# Patient Record
Sex: MALE | Race: WHITE | NOT HISPANIC OR LATINO | Employment: OTHER | URBAN - METROPOLITAN AREA
[De-identification: names, ages, dates, MRNs, and addresses within clinical notes are randomized per-mention and may not be internally consistent; named-entity substitution may affect disease eponyms.]

---

## 2017-12-10 RX ORDER — METFORMIN HYDROCHLORIDE 500 MG/1
TABLET ORAL
Qty: 60 TABLET | Refills: 0 | OUTPATIENT
Start: 2017-12-10

## 2017-12-10 NOTE — TELEPHONE ENCOUNTER
Please inquire about more information about Rashid Del Valle; labs were ordered by Dr. Gabriel Rogers, on plain MD script; [facility]; in encounters no [facility] MD has seen him; he has no PCP listed; unsure why script for metformin came to me. Please see if you can find out. Request likely needs to be sent back to lab ordering MD, Dr Gabriel Rogers. Labs done at [facility]. Is Dr Rogers a [facility] MD.

## 2017-12-11 NOTE — TELEPHONE ENCOUNTER
Spoke with pt and he said that his PCP is Dr. Gabriel oRgers and that he will contact there office for his refill.

## 2018-03-06 ENCOUNTER — PATIENT OUTREACH (OUTPATIENT)
Dept: ADMINISTRATIVE | Facility: HOSPITAL | Age: 48
End: 2018-03-06

## 2018-03-06 NOTE — LETTER
March 6, 2018    Rashid Del Valle  [address] [place] [address]             [facility]  [address]  [facility] [address]  Phone: [phone] Dear Mr. Del Valle:    [facility] is committed to your overall health.  To help you get the most out of each of your visits, we will review your information to make sure you are up to date on all of your recommended tests and/or procedures.      As a new patient to Dr. Matteo Torre, we may not have your complete medical records.  He has found that your chart shows you may be due for tetanus and flu immunizations.     If you have had any of the above done at another facility, please bring the records or information with you so that your record at [facility] will be complete.      If you are currently taking medication, please bring it with you to your appointment for review.    If you have any questions or concerns, please don't hesitate to call.    Thank you for letting us care for you,  Bev Abarca LPN Clinical Care Coordinator  [facility] [place]  [phone]

## 2018-03-20 ENCOUNTER — OFFICE VISIT (OUTPATIENT)
Dept: FAMILY MEDICINE | Facility: CLINIC | Age: 48
End: 2018-03-20
Payer: COMMERCIAL

## 2018-03-20 VITALS
OXYGEN SATURATION: 97 % | WEIGHT: 218.81 LBS | HEART RATE: 76 BPM | SYSTOLIC BLOOD PRESSURE: 110 MMHG | HEIGHT: 71 IN | TEMPERATURE: 99 F | BODY MASS INDEX: 30.63 KG/M2 | DIASTOLIC BLOOD PRESSURE: 80 MMHG

## 2018-03-20 DIAGNOSIS — K30 STOMACH UPSET: ICD-10-CM

## 2018-03-20 DIAGNOSIS — E66.9 OBESITY (BMI 30.0-34.9): ICD-10-CM

## 2018-03-20 DIAGNOSIS — E78.2 MIXED HYPERLIPIDEMIA: ICD-10-CM

## 2018-03-20 DIAGNOSIS — E11.9 TYPE 2 DIABETES MELLITUS WITHOUT COMPLICATION, WITHOUT LONG-TERM CURRENT USE OF INSULIN: Primary | ICD-10-CM

## 2018-03-20 DIAGNOSIS — F41.1 GAD (GENERALIZED ANXIETY DISORDER): ICD-10-CM

## 2018-03-20 LAB — GLUCOSE SERPL-MCNC: 172 MG/DL (ref 70–110)

## 2018-03-20 PROCEDURE — 99999 PR PBB SHADOW E&M-EST. PATIENT-LVL III: CPT | Mod: PBBFAC,,, | Performed by: INTERNAL MEDICINE

## 2018-03-20 PROCEDURE — 82948 REAGENT STRIP/BLOOD GLUCOSE: CPT | Mod: S$GLB,,, | Performed by: INTERNAL MEDICINE

## 2018-03-20 PROCEDURE — 99204 OFFICE O/P NEW MOD 45 MIN: CPT | Mod: S$GLB,,, | Performed by: INTERNAL MEDICINE

## 2018-03-20 RX ORDER — ASPIRIN 81 MG/1
81 TABLET ORAL DAILY
COMMUNITY
End: 2022-08-18

## 2018-03-20 RX ORDER — OMEPRAZOLE 20 MG/1
CAPSULE, DELAYED RELEASE ORAL
Qty: 30 CAPSULE | Refills: 11 | Status: SHIPPED | OUTPATIENT
Start: 2018-03-20 | End: 2018-03-20

## 2018-03-20 RX ORDER — METFORMIN HYDROCHLORIDE 500 MG/1
500 TABLET, EXTENDED RELEASE ORAL
Qty: 30 TABLET | Refills: 2 | Status: SHIPPED | OUTPATIENT
Start: 2018-03-20 | End: 2018-06-19 | Stop reason: SDUPTHER

## 2018-03-20 RX ORDER — OMEPRAZOLE 20 MG/1
CAPSULE, DELAYED RELEASE ORAL
Qty: 30 CAPSULE | Refills: 2 | Status: SHIPPED | OUTPATIENT
Start: 2018-03-20 | End: 2019-08-13

## 2018-03-20 RX ORDER — CLONAZEPAM 1 MG/1
1 TABLET ORAL 3 TIMES DAILY
COMMUNITY

## 2018-03-20 RX ORDER — METFORMIN HYDROCHLORIDE 500 MG/1
500 TABLET ORAL
COMMUNITY
End: 2018-03-20

## 2018-03-20 RX ORDER — BUSPIRONE HYDROCHLORIDE 10 MG/1
10 TABLET ORAL 2 TIMES DAILY
COMMUNITY
End: 2018-12-29

## 2018-03-20 RX ORDER — CITALOPRAM 40 MG/1
40 TABLET, FILM COATED ORAL DAILY
COMMUNITY

## 2018-03-20 NOTE — PROGRESS NOTES
Subjective:       Patient ID: Rashid Del Valle is a 47 y.o. male.    Chief Complaint: to get established; DM tx.    HPI  Pt here to get established w me again as his PCP here at [facility] in [place]. PMH/surg hx reviewed and noted. SM[facility] also reviewed and noted.   ROS obtained at length prior to physical performed. DM2: CBG's avr 126; on metformin 500 mg daily in am. POCT glucose 172; hamburger 30 min ago. Regular stomach upset; not sure if related to metformin; will change to metformin XR; add omeprazole 20 mg a day as needed for stomach upset. Total time: 300 pm-345 pm; >50% time spent on discussion, counseling, and review.  Labs ordered for f/u. Old labs reviewed.    Review of Systems   Constitutional: Negative for appetite change and unexpected weight change.   HENT: Negative for congestion, postnasal drip, rhinorrhea and sinus pressure.         Denies seasonal allergies, or perennial allergies   Eyes: Negative for discharge and itching.   Respiratory: Negative for cough, chest tightness, shortness of breath and wheezing.    Cardiovascular: Negative for chest pain, palpitations and leg swelling.   Gastrointestinal: Negative for abdominal pain, blood in stool, constipation, diarrhea, nausea and vomiting.   Endocrine: Negative for polydipsia, polyphagia and polyuria.   Genitourinary: Negative for dysuria and hematuria.   Musculoskeletal: Negative for arthralgias and myalgias.   Skin: Negative for rash.   Allergic/Immunologic: Negative for environmental allergies and food allergies.   Neurological: Negative for tremors, seizures and headaches.   Hematological: Negative for adenopathy. Does not bruise/bleed easily.   Psychiatric/Behavioral:        Denies anxiety or depression.       Objective:      Vitals:    03/20/18 1428   BP: 110/80   BP Location: Left arm   Patient Position: Sitting   BP Method: Medium (Manual)   Pulse: 76   Temp: 98.6 °F (37 °C)   TempSrc: Oral   SpO2: 97%   Weight: 99.2 kg (218 lb 12.9  "oz)   Height: 5' 11" (1.803 m)     Body mass index is 30.52 kg/m².    Physical Exam   Constitutional: He is oriented to person, place, and time. He appears well-developed and well-nourished.   HENT:   Head: Normocephalic and atraumatic.   Eyes: EOM are normal.   Neck: Normal range of motion. Neck supple. No thyromegaly present.   Cardiovascular: Normal rate, regular rhythm and normal heart sounds.  Exam reveals no gallop.    No murmur heard.  Pulmonary/Chest: Effort normal and breath sounds normal. No respiratory distress. He has no wheezes. He has no rales.   Abdominal: Soft. Bowel sounds are normal. He exhibits no distension. There is no tenderness. There is no rebound and no guarding.   Musculoskeletal: Normal range of motion. He exhibits no edema.   Lymphadenopathy:     He has no cervical adenopathy.   Neurological: He is alert and oriented to person, place, and time.   Moves all 4 extremities fine.   Skin: No rash noted.   Psychiatric: He has a normal mood and affect. His behavior is normal. Thought content normal.   Vitals reviewed.      Assessment:       1. Type 2 diabetes mellitus without complication, without long-term current use of insulin    2. Mixed hyperlipidemia    3. Obesity (BMI 30.0-34.9)    4. DESI (generalized anxiety disorder)    5. Stomach upset        Plan:       Type 2 diabetes mellitus without complication, without long-term current use of insulin. Maintain a low carb diet; monitor CBG's at home; keep a log for review.         Cont metformin.but change to XR to help stomach. Ophth eye exam needed as well; has been >1 yr. Will go to [facility].   -     POCT Glucose; labs on f/u; FBS, HgA1C, UA dip; urine prealb-cr ratio.    Mixed hyperlipidemia. Maintain low fat high fiber diet, exercise regularly. Lipid level on f/u. Weight reduction.    Obesity (BMI 30.0-34.9). Caloric restriction w regular exercise and weight reduction.    DESI; situational stress as well; manages and owns dancing " school. Seeing Dr Porter as psychiatrist; on celexa, buspar, and clonazepam,     Stomach upset; anxiety, vs. Meds, vs. Metformin. Or combination. Omeprazole 20 mg po daily as needed for stomach upset;          can increase to 40 mg a day if needed.

## 2018-03-20 NOTE — PATIENT INSTRUCTIONS
Type 2 diabetes mellitus without complication, without long-term current use of insulin. Maintain a low carb diet; monitor CBG's at home; keep a log for review.         Cont metformin.but change to XR to help stomach.  -     POCT Glucose    Mixed hyperlipidemia. Maintain low fat high fiber diet, exercise regularly. Weight reduction    Obesity (BMI 30.0-34.9). Caloric restriction w regular exercise and weight reduction.    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 Meds, vs. Metformin. Or combination. Omeprazole 20 mg po daily as needed for stomach upset;          can increase to 40 mg a day if needed.

## 2018-03-29 ENCOUNTER — OFFICE VISIT (OUTPATIENT)
Dept: FAMILY MEDICINE | Facility: CLINIC | Age: 48
End: 2018-03-29
Payer: COMMERCIAL

## 2018-03-29 VITALS
WEIGHT: 212.5 LBS | BODY MASS INDEX: 29.75 KG/M2 | HEIGHT: 71 IN | TEMPERATURE: 98 F | DIASTOLIC BLOOD PRESSURE: 80 MMHG | HEART RATE: 74 BPM | SYSTOLIC BLOOD PRESSURE: 120 MMHG

## 2018-03-29 DIAGNOSIS — Z81.8 FAMILY HISTORY OF DEPRESSION: ICD-10-CM

## 2018-03-29 DIAGNOSIS — G47.00 INSOMNIA, UNSPECIFIED TYPE: ICD-10-CM

## 2018-03-29 DIAGNOSIS — R10.12 ACUTE BILATERAL UPPER ABDOMINAL PAIN: Primary | ICD-10-CM

## 2018-03-29 DIAGNOSIS — R10.11 ACUTE BILATERAL UPPER ABDOMINAL PAIN: Primary | ICD-10-CM

## 2018-03-29 DIAGNOSIS — R68.83 CHILLS WITHOUT FEVER: ICD-10-CM

## 2018-03-29 DIAGNOSIS — F41.1 GAD (GENERALIZED ANXIETY DISORDER): ICD-10-CM

## 2018-03-29 DIAGNOSIS — F32.A DEPRESSION, UNSPECIFIED DEPRESSION TYPE: ICD-10-CM

## 2018-03-29 PROCEDURE — 99215 OFFICE O/P EST HI 40 MIN: CPT | Mod: S$GLB,,, | Performed by: INTERNAL MEDICINE

## 2018-03-29 PROCEDURE — 99999 PR PBB SHADOW E&M-EST. PATIENT-LVL III: CPT | Mod: PBBFAC,,, | Performed by: INTERNAL MEDICINE

## 2018-03-29 RX ORDER — QUETIAPINE FUMARATE 50 MG/1
50 TABLET, FILM COATED ORAL NIGHTLY
COMMUNITY

## 2018-03-29 RX ORDER — FLUOXETINE HYDROCHLORIDE 20 MG/1
1 CAPSULE ORAL DAILY
Refills: 1 | COMMUNITY
Start: 2018-03-22 | End: 2018-12-29

## 2018-03-29 RX ORDER — MIRTAZAPINE 30 MG/1
30 TABLET, FILM COATED ORAL NIGHTLY
COMMUNITY

## 2018-03-29 NOTE — PATIENT INSTRUCTIONS
Acute bilateral upper abdominal pain; RUQ/epigastric>LUQ abd pain, w intermittent chills at night; needs to go to [facility] for further evaluation and treatment asap; need to r/o cholecystitis/GB disease. For further evaluation of his abd pain including imaging and labs initially. PPI initiation. Long discussion w pt and his Dad. Understand imporatnce of going to ER and not to home.    Chills without fever    DESI (generalized anxiety disorder); meds as per Dr Rogers his psychiatrist. He needs to later call his psychiatrist regarding his meds as well. Also needs to be evaluated from psych standpoint as well; if cleared medically due to severe anxiety w depression, and psych hospitalization in past. Can benefit from psych consult as well if not psych admit when cleared medically.    Depression, unspecified depression type    Insomnia, unspecified type

## 2018-06-19 DIAGNOSIS — E11.9 TYPE 2 DIABETES MELLITUS WITHOUT COMPLICATION, WITHOUT LONG-TERM CURRENT USE OF INSULIN: ICD-10-CM

## 2018-06-20 RX ORDER — METFORMIN HYDROCHLORIDE 500 MG/1
TABLET, EXTENDED RELEASE ORAL
Qty: 30 TABLET | Refills: 0 | Status: SHIPPED | OUTPATIENT
Start: 2018-06-20 | End: 2018-07-27 | Stop reason: SDUPTHER

## 2018-07-27 DIAGNOSIS — E11.9 TYPE 2 DIABETES MELLITUS WITHOUT COMPLICATION, WITHOUT LONG-TERM CURRENT USE OF INSULIN: ICD-10-CM

## 2018-07-27 RX ORDER — METFORMIN HYDROCHLORIDE 500 MG/1
TABLET, EXTENDED RELEASE ORAL
Qty: 90 TABLET | Refills: 0 | Status: SHIPPED | OUTPATIENT
Start: 2018-07-27 | End: 2018-10-26 | Stop reason: SDUPTHER

## 2018-10-26 DIAGNOSIS — E11.9 TYPE 2 DIABETES MELLITUS WITHOUT COMPLICATION, WITHOUT LONG-TERM CURRENT USE OF INSULIN: ICD-10-CM

## 2018-10-26 NOTE — LETTER
October 31, 2018    Rashid BEAVERS Eligio  [address]  [place] [address]             [facility]  [address]  [place] [address]  Phone: [phone]  Fax: [phone] Dear Mr. Del Valle:    Please contact the office at your earliest convenience to schedule an appointment to see your primary care provider and blood work.      If you have any questions or concerns, please don't hesitate to call.    Sincerely,        Kiya Rojas LPN

## 2018-10-26 NOTE — TELEPHONE ENCOUNTER
Last seen on: 3/29/2018    Next appt: N/A    Last refill: 7/27/2018    Pt needs to be scheduled for a new appointment.    [facility] [id number] [place] [address] [facility] [address] [address]  Phone: [phone] Fax: [phone]      Labs: Please review.      Glucose   Date Value Ref Range Status   05/17/2018 133 (H) 70 - 110 mg/dL Final     Comment:     The ADA recommends the following guidelines for fasting glucose:  Normal:       less than 100 mg/dL  Prediabetes:  100 mg/dL to 125 mg/dL  Diabetes:     126 mg/dL or higher         Please review! Thank you!

## 2018-10-27 RX ORDER — METFORMIN HYDROCHLORIDE 500 MG/1
TABLET, EXTENDED RELEASE ORAL
Qty: 90 TABLET | Refills: 0 | Status: SHIPPED | OUTPATIENT
Start: 2018-10-27 | End: 2019-01-09 | Stop reason: SDUPTHER

## 2018-10-29 NOTE — TELEPHONE ENCOUNTER
Tried to reach pt. No answer, left msg to call back.    [facility] Follow up

## 2018-12-29 PROBLEM — R42 DIZZINESS: Status: ACTIVE | Noted: 2018-12-29

## 2018-12-29 PROBLEM — R07.9 CHEST PAIN: Status: ACTIVE | Noted: 2018-12-29

## 2018-12-29 PROBLEM — J18.9 PNEUMONIA OF BOTH UPPER LOBES DUE TO INFECTIOUS ORGANISM: Status: ACTIVE | Noted: 2018-12-29

## 2019-01-04 DIAGNOSIS — E11.9 TYPE 2 DIABETES MELLITUS WITHOUT COMPLICATION: ICD-10-CM

## 2019-01-09 DIAGNOSIS — E11.9 TYPE 2 DIABETES MELLITUS WITHOUT COMPLICATION, WITHOUT LONG-TERM CURRENT USE OF INSULIN: ICD-10-CM

## 2019-01-09 RX ORDER — METFORMIN HYDROCHLORIDE 500 MG/1
TABLET, EXTENDED RELEASE ORAL
Qty: 30 TABLET | Refills: 0 | Status: SHIPPED | OUTPATIENT
Start: 2019-01-09 | End: 2019-02-06 | Stop reason: SDUPTHER

## 2019-01-09 NOTE — TELEPHONE ENCOUNTER
----- Message from Nabil Nichols sent at 1/9/2019  2:46 PM CST -----  Contact: same  Patient called in and stated he is completely out of his Rx for his metFORMIN (GLUCOPHAGE-XR) 500 MG 24 hr tablet and not sure why?  Patient stated he really needs a refill called in to the below pharmacy.  Rx was last filled on 10/27/17 for 90 tablets.      [facility] [address] [place] [address] & [address] [address]  Phone: [phone] Fax: [phone]    Patient call back number is [phone]

## 2019-01-09 NOTE — TELEPHONE ENCOUNTER
Pt Of Matteo Torre MD    Last seen on: 3-    Next appt: 2-6-2019    Last refill: 10-    Allergies: Review of patient's allergies indicates:  No Known Allergies    Pharmacy:   [facility] [address] [place] [address] AT [address] [address]  Phone: [phone] Fax: [phone]      Labs: Please review.    Lab Results   Component Value Date    HGBA1C 6.6 (H) 12/29/2018         Please review! Thank you!

## 2019-01-18 DIAGNOSIS — E11.9 TYPE 2 DIABETES MELLITUS WITHOUT COMPLICATION, UNSPECIFIED WHETHER LONG TERM INSULIN USE: ICD-10-CM

## 2019-02-06 DIAGNOSIS — E11.9 TYPE 2 DIABETES MELLITUS WITHOUT COMPLICATION, WITHOUT LONG-TERM CURRENT USE OF INSULIN: ICD-10-CM

## 2019-02-06 NOTE — TELEPHONE ENCOUNTER
----- Message from Caitlin Kohler RT sent at 2/6/2019  3:15 PM CST -----  Contact: pt   pt  , requesting an appt to be worked in as soon as possible: Hospital f/u appt, also need medication refill: Metformin, thanks.

## 2019-02-07 RX ORDER — METFORMIN HYDROCHLORIDE 500 MG/1
TABLET, EXTENDED RELEASE ORAL
Qty: 90 TABLET | Refills: 1 | Status: SHIPPED | OUTPATIENT
Start: 2019-02-07 | End: 2019-07-17 | Stop reason: SDUPTHER

## 2019-03-04 ENCOUNTER — OFFICE VISIT (OUTPATIENT)
Dept: URGENT CARE | Facility: CLINIC | Age: 49
End: 2019-03-04
Payer: COMMERCIAL

## 2019-03-04 VITALS
WEIGHT: 250 LBS | DIASTOLIC BLOOD PRESSURE: 110 MMHG | TEMPERATURE: 98 F | OXYGEN SATURATION: 97 % | HEIGHT: 70 IN | HEART RATE: 88 BPM | SYSTOLIC BLOOD PRESSURE: 130 MMHG | BODY MASS INDEX: 35.79 KG/M2

## 2019-03-04 DIAGNOSIS — J06.9 BACTERIAL URI: Primary | ICD-10-CM

## 2019-03-04 DIAGNOSIS — B96.89 BACTERIAL URI: Primary | ICD-10-CM

## 2019-03-04 PROCEDURE — 99214 OFFICE O/P EST MOD 30 MIN: CPT | Mod: S$GLB,,, | Performed by: PHYSICIAN ASSISTANT

## 2019-03-04 PROCEDURE — 99214 PR OFFICE/OUTPT VISIT, EST, LEVL IV, 30-39 MIN: ICD-10-PCS | Mod: S$GLB,,, | Performed by: PHYSICIAN ASSISTANT

## 2019-03-04 PROCEDURE — 3008F BODY MASS INDEX DOCD: CPT | Mod: CPTII,S$GLB,, | Performed by: PHYSICIAN ASSISTANT

## 2019-03-04 PROCEDURE — 3008F PR BODY MASS INDEX (BMI) DOCUMENTED: ICD-10-PCS | Mod: CPTII,S$GLB,, | Performed by: PHYSICIAN ASSISTANT

## 2019-03-04 RX ORDER — AZITHROMYCIN 250 MG/1
TABLET, FILM COATED ORAL
Qty: 6 TABLET | Refills: 0 | Status: SHIPPED | OUTPATIENT
Start: 2019-03-04 | End: 2019-04-01 | Stop reason: ALTCHOICE

## 2019-03-04 RX ORDER — HYDROXYZINE HYDROCHLORIDE 50 MG/1
TABLET, FILM COATED ORAL
Refills: 2 | COMMUNITY
Start: 2019-02-21

## 2019-03-04 RX ORDER — BENZONATATE 100 MG/1
100 CAPSULE ORAL EVERY 6 HOURS PRN
Qty: 60 CAPSULE | Refills: 1 | Status: SHIPPED | OUTPATIENT
Start: 2019-03-04 | End: 2019-05-17 | Stop reason: ALTCHOICE

## 2019-03-05 NOTE — PROGRESS NOTES
"Subjective:       Patient ID: Rashid Del Valle is a 48 y.o. male.    Vitals:  height is 5' 10" (1.778 m) and weight is 113.4 kg (250 lb). His oral temperature is 97.6 °F (36.4 °C). His blood pressure is 130/110 (abnormal) and his pulse is 88. His oxygen saturation is 97%.     Chief Complaint: URI    x1 week, NO OTC medication today. Pt concerned for pneumonia.       URI    This is a new problem. The current episode started 1 to 4 weeks ago. The problem has been gradually worsening. Associated symptoms include congestion (chest) and coughing. Pertinent negatives include no ear pain, nausea, rash, sinus pain, sore throat, vomiting or wheezing. He has tried antihistamine and decongestant for the symptoms. The treatment provided mild relief.       Constitution: Positive for fatigue. Negative for chills, sweating and fever.   HENT: Positive for congestion (chest). Negative for ear pain, sinus pain, sinus pressure, sore throat and voice change.    Neck: Negative for painful lymph nodes.   Eyes: Negative for eye redness.   Respiratory: Positive for cough, sputum production and shortness of breath. Negative for chest tightness, bloody sputum, COPD, stridor, wheezing and asthma.    Gastrointestinal: Negative for nausea and vomiting.   Musculoskeletal: Positive for pain (left arm occasionally). Negative for muscle ache.   Skin: Negative for rash.   Allergic/Immunologic: Negative for seasonal allergies and asthma.   Hematologic/Lymphatic: Negative for swollen lymph nodes.       Objective:      Physical Exam   Constitutional: He is oriented to person, place, and time. He appears well-developed and well-nourished. He is cooperative.  Non-toxic appearance. He does not appear ill. No distress.   HENT:   Head: Normocephalic and atraumatic.   Right Ear: Hearing, tympanic membrane, external ear and ear canal normal.   Left Ear: Hearing, tympanic membrane, external ear and ear canal normal.   Nose: Nose normal. No mucosal edema, " rhinorrhea or nasal deformity. No epistaxis. Right sinus exhibits no maxillary sinus tenderness and no frontal sinus tenderness. Left sinus exhibits no maxillary sinus tenderness and no frontal sinus tenderness.   Mouth/Throat: Uvula is midline and mucous membranes are normal. No trismus in the jaw. Normal dentition. No uvula swelling. Posterior oropharyngeal erythema present.   Eyes: Conjunctivae and lids are normal. No scleral icterus.   Sclera clear bilat   Neck: Trachea normal, full passive range of motion without pain and phonation normal. Neck supple.   Cardiovascular: Normal rate, regular rhythm, normal heart sounds, intact distal pulses and normal pulses.   Pulmonary/Chest: Effort normal and breath sounds normal. No respiratory distress.   Abdominal: Soft. Normal appearance and bowel sounds are normal. He exhibits no distension. There is no tenderness.   Musculoskeletal: Normal range of motion. He exhibits no edema or deformity.   Neurological: He is alert and oriented to person, place, and time. He exhibits normal muscle tone. Coordination normal.   Skin: Skin is warm, dry and intact. He is not diaphoretic. No pallor.   Psychiatric: He has a normal mood and affect. His speech is normal and behavior is normal. Judgment and thought content normal. Cognition and memory are normal.   Nursing note and vitals reviewed.      Assessment:       1. Bacterial URI        Plan:         Bacterial URI    Other orders  -     azithromycin (Z-RED) 250 MG tablet; Take 2 tablets by mouth on day 1; Take 1 tablet by mouth on days 2-5  Dispense: 6 tablet; Refill: 0  -     benzonatate (TESSALON PERLES) 100 MG capsule; Take 1 capsule (100 mg total) by mouth every 6 (six) hours as needed.  Dispense: 60 capsule; Refill: 1     Patient instructed to go to the ER with any increasing shortness of breath, chest pain, fever, chills, nausea or vomiting.  Elevated blood pressure likely due to OTC preparation of Mucinex.  Discussed  discontinuing this immediately.  Patient states understanding and agrees with plan.    You must understand that you've received an Urgent Care treatment only and that you may be released before all your medical problems are known or treated. You, the patient, will arrange for follow up care as instructed.  Follow up with your PCP or specialty clinic as directed in the next 1-2 weeks if not improved or as needed.  You can call [phone] to schedule an appointment with the appropriate provider.  If your condition worsens we recommend that you receive another evaluation at the emergency room immediately or contact your primary medical clinics after hours call service to discuss your concerns.  Please return here or go to the Emergency Department for any concerns or worsening of condition.

## 2019-03-05 NOTE — PATIENT INSTRUCTIONS

## 2019-03-07 ENCOUNTER — TELEPHONE (OUTPATIENT)
Dept: URGENT CARE | Facility: CLINIC | Age: 49
End: 2019-03-07

## 2019-04-01 ENCOUNTER — TELEPHONE (OUTPATIENT)
Dept: FAMILY MEDICINE | Facility: CLINIC | Age: 49
End: 2019-04-01

## 2019-04-01 ENCOUNTER — OFFICE VISIT (OUTPATIENT)
Dept: FAMILY MEDICINE | Facility: CLINIC | Age: 49
End: 2019-04-01
Payer: COMMERCIAL

## 2019-04-01 VITALS
OXYGEN SATURATION: 97 % | BODY MASS INDEX: 35.87 KG/M2 | SYSTOLIC BLOOD PRESSURE: 108 MMHG | TEMPERATURE: 98 F | RESPIRATION RATE: 15 BRPM | HEART RATE: 81 BPM | WEIGHT: 250.56 LBS | DIASTOLIC BLOOD PRESSURE: 84 MMHG | HEIGHT: 70 IN

## 2019-04-01 DIAGNOSIS — R07.89 ATYPICAL CHEST PAIN: ICD-10-CM

## 2019-04-01 DIAGNOSIS — E11.9 TYPE 2 DIABETES MELLITUS WITHOUT COMPLICATION, WITHOUT LONG-TERM CURRENT USE OF INSULIN: ICD-10-CM

## 2019-04-01 DIAGNOSIS — R07.89 ATYPICAL CHEST PAIN: Primary | ICD-10-CM

## 2019-04-01 DIAGNOSIS — Z82.49 FAMILY HISTORY OF HEART DISEASE: ICD-10-CM

## 2019-04-01 DIAGNOSIS — J18.9 MULTIFOCAL PNEUMONIA: ICD-10-CM

## 2019-04-01 DIAGNOSIS — E03.9 BORDERLINE HYPOTHYROIDISM: ICD-10-CM

## 2019-04-01 DIAGNOSIS — E78.2 MIXED HYPERLIPIDEMIA: ICD-10-CM

## 2019-04-01 DIAGNOSIS — D64.9 ANEMIA, UNSPECIFIED TYPE: ICD-10-CM

## 2019-04-01 DIAGNOSIS — E66.9 OBESITY (BMI 30.0-34.9): ICD-10-CM

## 2019-04-01 DIAGNOSIS — F41.8 ANXIETY WITH DEPRESSION: ICD-10-CM

## 2019-04-01 DIAGNOSIS — K21.9 GASTROESOPHAGEAL REFLUX DISEASE, ESOPHAGITIS PRESENCE NOT SPECIFIED: ICD-10-CM

## 2019-04-01 DIAGNOSIS — I10 ESSENTIAL HYPERTENSION: Primary | ICD-10-CM

## 2019-04-01 DIAGNOSIS — E55.9 VITAMIN D INSUFFICIENCY: ICD-10-CM

## 2019-04-01 DIAGNOSIS — E11.8 TYPE 2 DIABETES MELLITUS WITH COMPLICATION, WITHOUT LONG-TERM CURRENT USE OF INSULIN: ICD-10-CM

## 2019-04-01 LAB — GLUCOSE SERPL-MCNC: 170 MG/DL (ref 70–110)

## 2019-04-01 PROCEDURE — 93005 EKG 12-LEAD: ICD-10-PCS | Mod: S$GLB,,, | Performed by: INTERNAL MEDICINE

## 2019-04-01 PROCEDURE — 3044F HG A1C LEVEL LT 7.0%: CPT | Mod: CPTII,S$GLB,, | Performed by: INTERNAL MEDICINE

## 2019-04-01 PROCEDURE — 3079F DIAST BP 80-89 MM HG: CPT | Mod: CPTII,S$GLB,, | Performed by: INTERNAL MEDICINE

## 2019-04-01 PROCEDURE — 3079F PR MOST RECENT DIASTOLIC BLOOD PRESSURE 80-89 MM HG: ICD-10-PCS | Mod: CPTII,S$GLB,, | Performed by: INTERNAL MEDICINE

## 2019-04-01 PROCEDURE — 3074F PR MOST RECENT SYSTOLIC BLOOD PRESSURE < 130 MM HG: ICD-10-PCS | Mod: CPTII,S$GLB,, | Performed by: INTERNAL MEDICINE

## 2019-04-01 PROCEDURE — 3044F PR MOST RECENT HEMOGLOBIN A1C LEVEL <7.0%: ICD-10-PCS | Mod: CPTII,S$GLB,, | Performed by: INTERNAL MEDICINE

## 2019-04-01 PROCEDURE — 93010 ELECTROCARDIOGRAM REPORT: CPT | Mod: S$GLB,,, | Performed by: INTERNAL MEDICINE

## 2019-04-01 PROCEDURE — 93005 ELECTROCARDIOGRAM TRACING: CPT | Mod: S$GLB,,, | Performed by: INTERNAL MEDICINE

## 2019-04-01 PROCEDURE — 82962 POCT GLUCOSE, HAND-HELD DEVICE: ICD-10-PCS | Mod: S$GLB,,, | Performed by: INTERNAL MEDICINE

## 2019-04-01 PROCEDURE — 99214 OFFICE O/P EST MOD 30 MIN: CPT | Mod: S$GLB,,, | Performed by: INTERNAL MEDICINE

## 2019-04-01 PROCEDURE — 93010 EKG 12-LEAD: ICD-10-PCS | Mod: S$GLB,,, | Performed by: INTERNAL MEDICINE

## 2019-04-01 PROCEDURE — 3074F SYST BP LT 130 MM HG: CPT | Mod: CPTII,S$GLB,, | Performed by: INTERNAL MEDICINE

## 2019-04-01 PROCEDURE — 3008F BODY MASS INDEX DOCD: CPT | Mod: CPTII,S$GLB,, | Performed by: INTERNAL MEDICINE

## 2019-04-01 PROCEDURE — 3008F PR BODY MASS INDEX (BMI) DOCUMENTED: ICD-10-PCS | Mod: CPTII,S$GLB,, | Performed by: INTERNAL MEDICINE

## 2019-04-01 PROCEDURE — 99999 PR PBB SHADOW E&M-EST. PATIENT-LVL V: ICD-10-PCS | Mod: PBBFAC,,, | Performed by: INTERNAL MEDICINE

## 2019-04-01 PROCEDURE — 99999 PR PBB SHADOW E&M-EST. PATIENT-LVL V: CPT | Mod: PBBFAC,,, | Performed by: INTERNAL MEDICINE

## 2019-04-01 PROCEDURE — 82962 GLUCOSE BLOOD TEST: CPT | Mod: S$GLB,,, | Performed by: INTERNAL MEDICINE

## 2019-04-01 PROCEDURE — 99214 PR OFFICE/OUTPT VISIT, EST, LEVL IV, 30-39 MIN: ICD-10-PCS | Mod: S$GLB,,, | Performed by: INTERNAL MEDICINE

## 2019-04-01 RX ORDER — PAROXETINE HYDROCHLORIDE 40 MG/1
40 TABLET, FILM COATED ORAL DAILY
COMMUNITY
Start: 2019-03-25

## 2019-04-01 RX ORDER — PANTOPRAZOLE SODIUM 40 MG/1
TABLET, DELAYED RELEASE ORAL
Refills: 3 | COMMUNITY
Start: 2019-02-26 | End: 2019-05-17

## 2019-04-01 NOTE — PATIENT INSTRUCTIONS
Essential hypertension. Maintain < 2 Gm Na a day diet, and monitor BP at home; keep a log.  -     CBC auto differential; Future; Expected date: 04/01/2019  -     Comprehensive metabolic panel; Future; Expected date: 04/01/2019  -     Lipid panel; Future; Expected date: 04/01/2019  -     TSH; Future; Expected date: 04/01/2019  -     T4, free; Future; Expected date: 04/01/2019  -     T3, free; Future; Expected date: 04/01/2019  -     Magnesium; Future; Expected date: 04/01/2019    Mixed hyperlipidemia. Maintain low fat high fiber diet, exercise regularly.  -     Comprehensive metabolic panel; Future; Expected date: 04/01/2019  -     Lipid panel; Future; Expected date: 04/01/2019  -     Ambulatory consult to Cardiology Dr Eastman    Type 2 diabetes mellitus without complication, without long-term current use of insulin; move metformin 500 mg XR to each breakfast. Maintain a low carb diet; monitor CBG's at home; keep a log for review.  -     blood sugar diagnostic Strp; 1 strip by Misc.(Non-Drug; Combo Route) route 2 (two) times daily. As per his insurance and glucose meter. To test blood sugar levels  Dispense: 100 strip; Refill: 1  -     Comprehensive metabolic panel; Future; Expected date: 04/01/2019  -     Hemoglobin A1c; Future; Expected date: 04/01/2019  -     POCT Glucose, Hand-Held Device  -     Ambulatory consult to Cardiology Dr Eastman    Borderline hypothyroidism; no on levothyroxine.   -     TSH; Future; Expected date: 04/01/2019  -     T4, free; Future; Expected date: 04/01/2019  -     T3, free; Future; Expected date: 04/01/2019    Anxiety with depression; keep f/u w Dr ALESSANDRO Abarca his psychiatrist. Managing his anxiety/depression.   -     TSH; Future; Expected date: 04/01/2019  -     T4, free; Future; Expected date: 04/01/2019  -     T3, free; Future; Expected date: 04/01/2019  -     Ambulatory consult to Cardiology Dr Eastman    Atypical chest pain  -     Ambulatory consult to Cardiology   Jaren; EKG in office w no ischemic changes noted. Add ASA 81 mg a day.   -     X-Ray Chest PA And Lateral; Future; Expected date: 04/01/2019  -     Troponin I; Future; Expected date: 04/01/2019    Obesity (BMI 30.0-34.9)  -     TSH; Future; Expected date: 04/01/2019  -     T4, free; Future; Expected date: 04/01/2019  -     T3, free; Future; Expected date: 04/01/2019    Gastroesophageal reflux disease, esophagitis presence not specified; No bedtime snacks; weight reduction. Zantac 150 mg 2x a day as needed for reflux.   -     Ambulatory consult to Cardiology    Multifocal pneumonia  -     X-Ray Chest PA And Lateral; Future; Expected date: 04/01/2019    Anemia, unspecified type  -     CBC auto differential; Future; Expected date: 04/01/2019    Vitamin D insufficiency  -     Vitamin D; Future; Expected date: 04/01/2019  -     Comprehensive metabolic panel; Future; Expected date: 04/01/2019    Family history of heart disease  -     Ambulatory consult to Cardiology Jaren.

## 2019-04-01 NOTE — TELEPHONE ENCOUNTER
----- Message from Linda Harper sent at 4/1/2019  3:01 PM CDT -----  Regarding: EKG ORDER  Please place and link EKG order to the EKG or Doctor appointment scheduled on 04/01/19 thanks. Linda [phone]

## 2019-04-01 NOTE — PROGRESS NOTES
Subjective:       Patient ID: Rashid Del Valle is a 48 y.o. male.    Chief Complaint: Diabetes (Follow up assessment of diabetes and hypertension ) and Hypertension    HPI  patient here today for reassessment and go over his labs.  Hypertension:  Watches his salt intake.  Manual blood pressure here is elevated at 130/98; repeat 108/84.   Blood pressure at home has been averaging- not being done; stressed importance.   Mixed hyperlipidemia:  On low-fat high-fiber diet or tries.   Obesity:  BMI elevated at 35.95; patient knows that he needs to exercise on a regular basis an employee caloric restriction help lose weight. Needs to exercise more regularly.   Diabetes mellitus type 2; blood sugars at home have been averaging- 110-150; hasn't been checking last 2-3 weeks. Patient is on metformin orally; will retry 500 mg XR in morning.   GERD:  Patient is on pantoprazole 40 mg daily; doing better w med.   Depression/anxiety: Dr Abarca his psychiatrist; sees in about 2 weeks; on seroquel/remeron/paroxetine/celexa; klonopin daily at 1 mg. .     Review of Systems   Constitutional: Positive for fatigue. Negative for appetite change and unexpected weight change.   HENT: Negative for congestion, postnasal drip, rhinorrhea and sinus pressure.         Denies seasonal allergies, or perennial allergies   Eyes: Negative for discharge and itching.   Respiratory: Positive for chest tightness. Negative for cough, shortness of breath and wheezing.         Substernal in location; last few minutes; occ radiates down LUE w associated SOB; no n/v. Occurs every other day or less; suspects likely from stress. Has been occurring for about 6 mos; more lately. 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 .    Cardiovascular: Negative for chest pain, palpitations and leg swelling.   Gastrointestinal: Negative for abdominal pain, blood in stool, constipation, diarrhea, nausea and vomiting.        Reflux controlled.   "  Endocrine: Negative for polydipsia, polyphagia and polyuria.   Genitourinary: Negative for dysuria and hematuria.   Musculoskeletal: Negative for arthralgias and myalgias.   Skin: Negative for rash.   Allergic/Immunologic: Negative for environmental allergies and food allergies.   Neurological: Negative for tremors, seizures and headaches.   Hematological: Negative for adenopathy. Does not bruise/bleed easily.   Psychiatric/Behavioral:        Anxiety w depression doing a lot better.        Objective:      Vitals:    04/01/19 1307 04/01/19 1400   BP: (!) 130/98 108/84   Pulse: 81    Resp: 15    Temp: 97.9 °F (36.6 °C)    TempSrc: Oral    SpO2: 97%    Weight: 113.6 kg (250 lb 8.8 oz)    Height: 5' 10" (1.778 m)      Body mass index is 35.95 kg/m².    Physical Exam   Constitutional: He is oriented to person, place, and time. He appears well-developed and well-nourished.   HENT:   Head: Normocephalic and atraumatic.   No carotid bruits heard.    Eyes: EOM are normal.   Neck: Normal range of motion. Neck supple. No thyromegaly present.   Cardiovascular: Normal rate, regular rhythm and normal heart sounds. Exam reveals no gallop.   No murmur heard.  Pulmonary/Chest: Effort normal and breath sounds normal. No respiratory distress. He has no wheezes. He has no rales.   Abdominal: Soft. Bowel sounds are normal. He exhibits no distension. There is no tenderness. There is no rebound and no guarding.   Musculoskeletal: Normal range of motion. He exhibits no edema.   Lymphadenopathy:     He has no cervical adenopathy.   Neurological: He is alert and oriented to person, place, and time.   Moves all 4 extremities fine.   Skin: No rash noted.   Psychiatric: He has a normal mood and affect. His behavior is normal. Thought content normal.   Vitals reviewed.      Assessment:       1. Essential hypertension    2. Mixed hyperlipidemia    3. Type 2 diabetes mellitus without complication, without long-term current use of insulin    4. " Borderline hypothyroidism    5. Anxiety with depression    6. Atypical chest pain    7. Obesity (BMI 30.0-34.9)    8. Gastroesophageal reflux disease, esophagitis presence not specified    9. Multifocal pneumonia    10. Anemia, unspecified type    11. Vitamin D insufficiency    12. Family history of heart disease        Plan:       Essential hypertension. Maintain < 2 Gm Na a day diet, and monitor BP at home; keep a log.  -     CBC auto differential; Future; Expected date: 04/01/2019  -     Comprehensive metabolic panel; Future; Expected date: 04/01/2019  -     Lipid panel; Future; Expected date: 04/01/2019  -     TSH; Future; Expected date: 04/01/2019  -     T4, free; Future; Expected date: 04/01/2019  -     T3, free; Future; Expected date: 04/01/2019  -     Magnesium; Future; Expected date: 04/01/2019    Mixed hyperlipidemia. Maintain low fat high fiber diet, exercise regularly.  -     Comprehensive metabolic panel; Future; Expected date: 04/01/2019  -     Lipid panel; Future; Expected date: 04/01/2019  -     Ambulatory consult to Cardiology Dr Eastman    Type 2 diabetes mellitus without complication, without long-term current use of insulin; move metformin 500 mg XR to each breakfast. Maintain a low carb diet; monitor CBG's at home; keep a log for review.  -     blood sugar diagnostic Strp; 1 strip by Misc.(Non-Drug; Combo Route) route 2 (two) times daily. As per his insurance and glucose meter. To test blood sugar levels  Dispense: 100 strip; Refill: 1  -     Comprehensive metabolic panel; Future; Expected date: 04/01/2019  -     Hemoglobin A1c; Future; Expected date: 04/01/2019  -     POCT Glucose, Hand-Held Device  -     Ambulatory consult to Cardiology Dr Eastman    Borderline hypothyroidism; nott on levothyroxine.   -     TSH; Future; Expected date: 04/01/2019  -     T4, free; Future; Expected date: 04/01/2019  -     T3, free; Future; Expected date: 04/01/2019    Anxiety with depression; keep f/u w   ALESSANDRO Abarca his psychiatrist. Managing his anxiety/depression.   -     TSH; Future; Expected date: 04/01/2019  -     T4, free; Future; Expected date: 04/01/2019  -     T3, free; Future; Expected date: 04/01/2019  -     Ambulatory consult to Cardiology Dr Eastman    Atypical chest pain; advised to go to ER if it should worsen  -     Ambulatory consult to Cardiology Dr Eastman; EKG in office w no ischemic changes noted. Add ASA 81 mg a day.   -     X-Ray Chest PA And Lateral; Future; Expected date: 04/01/2019  -     Troponin I; Future; Expected date: 04/01/2019    Obesity (BMI 30.0-34.9)Caloric restriction w regular exercise and weight reduction.  -     TSH; Future; Expected date: 04/01/2019  -     T4, free; Future; Expected date: 04/01/2019  -     T3, free; Future; Expected date: 04/01/2019    Gastroesophageal reflux disease, esophagitis presence not specified; No bedtime snacks; weight reduction. Zantac 150 mg 2x a day as needed for reflux.   -     Ambulatory consult to Cardiology Dr. Eastman.    Multifocal pneumonia; see 12/29/18 ER note; multifocal pneumonia on f/u CT chest 12/31/18; cough essentially cleared; no fever. Needs f/u Cxray  -     X-Ray Chest PA And Lateral; Future; Expected date: 04/01/2019    Anemia, unspecified type  -     CBC auto differential; Future; Expected date: 04/01/2019    Vitamin D insufficiency  -     Vitamin D; Future; Expected date: 04/01/2019  -     Comprehensive metabolic panel; Future; Expected date: 04/01/2019    Family history of heart disease  -     Ambulatory consult to Cardiology Dr Eastman.

## 2019-04-02 ENCOUNTER — TELEPHONE (OUTPATIENT)
Dept: ADMINISTRATIVE | Facility: HOSPITAL | Age: 49
End: 2019-04-02

## 2019-04-03 ENCOUNTER — HOSPITAL ENCOUNTER (OUTPATIENT)
Dept: RADIOLOGY | Facility: HOSPITAL | Age: 49
Discharge: HOME | End: 2019-04-03
Attending: INTERNAL MEDICINE
Payer: COMMERCIAL

## 2019-04-03 DIAGNOSIS — R07.89 ATYPICAL CHEST PAIN: ICD-10-CM

## 2019-04-03 DIAGNOSIS — J18.9 MULTIFOCAL PNEUMONIA: ICD-10-CM

## 2019-04-03 PROCEDURE — 71046 X-RAY EXAM CHEST 2 VIEWS: CPT | Mod: 26,,, | Performed by: RADIOLOGY

## 2019-04-03 PROCEDURE — 71046 X-RAY EXAM CHEST 2 VIEWS: CPT | Mod: TC,PN

## 2019-04-03 PROCEDURE — 71046 XR CHEST PA AND LATERAL: ICD-10-PCS | Mod: 26,,, | Performed by: RADIOLOGY

## 2019-04-09 PROBLEM — I10 ESSENTIAL HYPERTENSION: Status: ACTIVE | Noted: 2019-04-09

## 2019-04-19 ENCOUNTER — TELEPHONE (OUTPATIENT)
Dept: FAMILY MEDICINE | Facility: CLINIC | Age: 49
End: 2019-04-19

## 2019-05-17 ENCOUNTER — OFFICE VISIT (OUTPATIENT)
Dept: FAMILY MEDICINE | Facility: CLINIC | Age: 49
End: 2019-05-17
Payer: COMMERCIAL

## 2019-05-17 VITALS
HEIGHT: 71 IN | WEIGHT: 251.88 LBS | BODY MASS INDEX: 35.26 KG/M2 | TEMPERATURE: 98 F | DIASTOLIC BLOOD PRESSURE: 80 MMHG | SYSTOLIC BLOOD PRESSURE: 130 MMHG | HEART RATE: 82 BPM

## 2019-05-17 DIAGNOSIS — R07.89 ATYPICAL CHEST PAIN: ICD-10-CM

## 2019-05-17 DIAGNOSIS — F41.8 ANXIETY WITH DEPRESSION: ICD-10-CM

## 2019-05-17 DIAGNOSIS — E03.9 HYPOTHYROIDISM, UNSPECIFIED TYPE: ICD-10-CM

## 2019-05-17 DIAGNOSIS — I10 ESSENTIAL HYPERTENSION: Primary | ICD-10-CM

## 2019-05-17 DIAGNOSIS — E11.9 TYPE 2 DIABETES MELLITUS WITHOUT COMPLICATION, WITHOUT LONG-TERM CURRENT USE OF INSULIN: ICD-10-CM

## 2019-05-17 DIAGNOSIS — E78.2 MIXED HYPERLIPIDEMIA: ICD-10-CM

## 2019-05-17 DIAGNOSIS — E66.9 OBESITY (BMI 30.0-34.9): ICD-10-CM

## 2019-05-17 PROCEDURE — 3008F BODY MASS INDEX DOCD: CPT | Mod: CPTII,S$GLB,, | Performed by: INTERNAL MEDICINE

## 2019-05-17 PROCEDURE — 3079F DIAST BP 80-89 MM HG: CPT | Mod: CPTII,S$GLB,, | Performed by: INTERNAL MEDICINE

## 2019-05-17 PROCEDURE — 99214 OFFICE O/P EST MOD 30 MIN: CPT | Mod: S$GLB,,, | Performed by: INTERNAL MEDICINE

## 2019-05-17 PROCEDURE — 3008F PR BODY MASS INDEX (BMI) DOCUMENTED: ICD-10-PCS | Mod: CPTII,S$GLB,, | Performed by: INTERNAL MEDICINE

## 2019-05-17 PROCEDURE — 3079F PR MOST RECENT DIASTOLIC BLOOD PRESSURE 80-89 MM HG: ICD-10-PCS | Mod: CPTII,S$GLB,, | Performed by: INTERNAL MEDICINE

## 2019-05-17 PROCEDURE — 3045F PR MOST RECENT HEMOGLOBIN A1C LEVEL 7.0-9.0%: CPT | Mod: CPTII,S$GLB,, | Performed by: INTERNAL MEDICINE

## 2019-05-17 PROCEDURE — 99214 PR OFFICE/OUTPT VISIT, EST, LEVL IV, 30-39 MIN: ICD-10-PCS | Mod: S$GLB,,, | Performed by: INTERNAL MEDICINE

## 2019-05-17 PROCEDURE — 99999 PR PBB SHADOW E&M-EST. PATIENT-LVL III: ICD-10-PCS | Mod: PBBFAC,,, | Performed by: INTERNAL MEDICINE

## 2019-05-17 PROCEDURE — 3075F SYST BP GE 130 - 139MM HG: CPT | Mod: CPTII,S$GLB,, | Performed by: INTERNAL MEDICINE

## 2019-05-17 PROCEDURE — 99999 PR PBB SHADOW E&M-EST. PATIENT-LVL III: CPT | Mod: PBBFAC,,, | Performed by: INTERNAL MEDICINE

## 2019-05-17 PROCEDURE — 3075F PR MOST RECENT SYSTOLIC BLOOD PRESS GE 130-139MM HG: ICD-10-PCS | Mod: CPTII,S$GLB,, | Performed by: INTERNAL MEDICINE

## 2019-05-17 PROCEDURE — 3045F PR MOST RECENT HEMOGLOBIN A1C LEVEL 7.0-9.0%: ICD-10-PCS | Mod: CPTII,S$GLB,, | Performed by: INTERNAL MEDICINE

## 2019-05-17 RX ORDER — ROSUVASTATIN CALCIUM 20 MG/1
20 TABLET, COATED ORAL DAILY
Qty: 90 TABLET | Refills: 1 | Status: SHIPPED | OUTPATIENT
Start: 2019-05-17 | End: 2019-11-17 | Stop reason: SDUPTHER

## 2019-05-17 RX ORDER — LEVOTHYROXINE SODIUM 50 UG/1
50 TABLET ORAL DAILY
Qty: 90 TABLET | Refills: 1 | Status: SHIPPED | OUTPATIENT
Start: 2019-05-17 | End: 2019-07-17

## 2019-05-17 NOTE — PROGRESS NOTES
Subjective:       Patient ID: Rashid Del Valle is a 48 y.o. male.    Chief Complaint: Results (lab results and assessment of blood pressure )    HPI  Overall has been doing well.     HTN: watches his salt intake; BP manually here 130/80; at home avr ? To get cuff for home BP monitoring and keep a log.  Stressed importance and benefits of this.  Regular exercise will also help bring down his blood pressure.  Diet controlled.  Less than 2 g sodium intake per day along with low-fat diet.     DM2: BS avr 110-140; watches his carbs. 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  Fasting blood sugar 169 with hemoglobin A1c 7.0; will increase metformin to 500 mg 24 hr tablet to 2 a morning     Mix HLP; on low fat high fiber diet; not on a statin.     Anxiety with depression on medications; on citalopram and clonazepam as well as generic Depakote paroxetine, mirtazapine and generic Seroquel; also on Vistaril at night as needed generic; has a psychiatrist he is seeing as well; advised to keep his regular follow ups with his psychiatrist.  Reportedly has been stable.     History of chest pain:  Exercise stress test pending; has appointment with Cardiology as well pending.     Hypothyroidism; not on supplements; anxiety w depression on meds; no constipation, hair loss, or brittle nails. Fatigue though.  Will start levothyroxine 50 mcg daily     Obesity; BMI 35.13; knows need for exercise; smaller portions.     Review of Systems   Constitutional: Negative for appetite change and unexpected weight change.   HENT: Negative for congestion, postnasal drip, rhinorrhea and sinus pressure.         Denies seasonal allergies, or perennial allergies   Eyes: Negative for discharge and itching.   Respiratory: Positive for chest tightness. Negative for cough, shortness of breath and wheezing.         Occurs suspected from stress.    Cardiovascular: Negative for chest pain, palpitations  "and leg swelling.   Gastrointestinal: Negative for abdominal pain, blood in stool, constipation, diarrhea, nausea and vomiting.   Endocrine: Negative for polydipsia, polyphagia and polyuria.   Genitourinary: Negative for dysuria and hematuria.   Musculoskeletal: Negative for arthralgias and myalgias.   Skin: Negative for rash.   Allergic/Immunologic: Negative for environmental allergies and food allergies.   Neurological: Negative for tremors, seizures and headaches.   Hematological: Negative for adenopathy. Does not bruise/bleed easily.   Psychiatric/Behavioral:        Anxiety w depression on meds.        Objective:      Vitals:    05/17/19 1017   BP: 130/80   Pulse: 82   Temp: 98 °F (36.7 °C)   TempSrc: Oral   Weight: 114.3 kg (251 lb 14 oz)   Height: 5' 11" (1.803 m)     Body mass index is 35.13 kg/m².    Physical Exam   Constitutional: He is oriented to person, place, and time. He appears well-developed and well-nourished.   HENT:   Head: Normocephalic and atraumatic.   Eyes: EOM are normal.   Neck: Normal range of motion. Neck supple. No thyromegaly present.   No carotid bruits heard.    Cardiovascular: Normal rate, regular rhythm and normal heart sounds. Exam reveals no gallop.   No murmur heard.  Pulmonary/Chest: Effort normal and breath sounds normal. No respiratory distress. He has no wheezes. He has no rales.   Abdominal: Soft. Bowel sounds are normal. He exhibits no distension. There is no tenderness. There is no rebound and no guarding.   Musculoskeletal: Normal range of motion. He exhibits no edema.   Lymphadenopathy:     He has no cervical adenopathy.   Neurological: He is alert and oriented to person, place, and time.   Moves all 4 extremities fine.   Skin: No rash noted.   Psychiatric: He has a normal mood and affect. His behavior is normal. Thought content normal.   Vitals reviewed.      Assessment:       1. Essential hypertension    2. Mixed hyperlipidemia    3. Type 2 diabetes mellitus without " complication, without long-term current use of insulin    4. Hypothyroidism, unspecified type    5. Obesity (BMI 30.0-34.9)    6. Anxiety with depression    7. Atypical chest pain        Plan:       Essential hypertension.Maintain < 2 Gm Na a day diet, and needs to start to monitor BP at home; keep a log. Same treatment.   -     Comprehensive metabolic panel; Future; Expected date: 05/17/2019  -     Lipid panel; Future; Expected date: 05/17/2019    Mixed hyperlipidemia; begin crestor 20 mg a day and fish oil 1200 mg 2x a day; maintain low fat high fiber diet, exercise regularly.  -     T4, free; Future; Expected date: 05/17/2019  -     T3, free; Future; Expected date: 05/17/2019  -     TSH; Future; Expected date: 05/17/2019  -     Thyroid peroxidase antibody; Future; Expected date: 05/17/2019  -     Thyroid stimulating immunoglobulin; Future; Expected date: 05/17/2019  -     Comprehensive metabolic panel; Future; Expected date: 05/17/2019  -     Lipid panel; Future; Expected date: 05/17/2019    Type 2 diabetes mellitus without complication, without long-term current use of insulin; maintain a low carb diet; monitor CBG's at home; keep a log for review.  Increase metformin to 500 mg 24 hr 2 a morning.    Hypothyroidism, unspecified type; begin synthroid 50 mcg a day.   -     T4, free; Future; Expected date: 05/17/2019  -     T3, free; Future; Expected date: 05/17/2019  -     TSH; Future; Expected date: 05/17/2019  -     Thyroid peroxidase antibody; Future; Expected date: 05/17/2019  -     Thyroid stimulating immunoglobulin; Future; Expected date: 05/17/2019    Obesity (BMI 30.0-34.9). Caloric restriction w regular exercise and weight reduction.  -     T4, free; Future; Expected date: 05/17/2019  -     T3, free; Future; Expected date: 05/17/2019  -     TSH; Future; Expected date: 05/17/2019    Anxiety with depression; cont same meds as per his psychiatrist. Exercise w stress reduction.  Limitation of caffeine  intake  -     T4, free; Future; Expected date: 05/17/2019  -     T3, free; Future; Expected date: 05/17/2019  -     TSH; Future; Expected date: 05/17/2019    Atypical chest pain; can last 1 hr at time w stress. Refer to cardiology Dr Eastman; exercise stress test pending; add ASA prior; to ER if worsens.     Other orders  -     levothyroxine (SYNTHROID) 50 MCG tablet; Take 1 tablet (50 mcg total) by mouth once daily.  Dispense: 90 tablet; Refill: 1

## 2019-05-17 NOTE — PATIENT INSTRUCTIONS
Essential hypertension.Maintain < 2 Gm Na a day diet, and monitor BP at home; keep a log. Same treatment.   -     Comprehensive metabolic panel; Future; Expected date: 05/17/2019  -     Lipid panel; Future; Expected date: 05/17/2019    Mixed hyperlipidemia; begin crestor 20 mg a day and fish oil 1200 mg 2x a day; low fat high fiber diet.  -     T4, free; Future; Expected date: 05/17/2019  -     T3, free; Future; Expected date: 05/17/2019  -     TSH; Future; Expected date: 05/17/2019  -     Thyroid peroxidase antibody; Future; Expected date: 05/17/2019  -     Thyroid stimulating immunoglobulin; Future; Expected date: 05/17/2019  -     Comprehensive metabolic panel; Future; Expected date: 05/17/2019  -     Lipid panel; Future; Expected date: 05/17/2019    Type 2 diabetes mellitus without complication, without long-term current use of insulin; Maintain a low carb diet; monitor CBG's at home; keep a log for review.  Increase metformin to 500 mg 24 hr 2 a morning.    Hypothyroidism, unspecified type; begin synthroid 50 mcg a day.   -     T4, free; Future; Expected date: 05/17/2019  -     T3, free; Future; Expected date: 05/17/2019  -     TSH; Future; Expected date: 05/17/2019  -     Thyroid peroxidase antibody; Future; Expected date: 05/17/2019  -     Thyroid stimulating immunoglobulin; Future; Expected date: 05/17/2019    Obesity (BMI 30.0-34.9).Caloric restriction w regular exercise and weight reduction.  -     T4, free; Future; Expected date: 05/17/2019  -     T3, free; Future; Expected date: 05/17/2019  -     TSH; Future; Expected date: 05/17/2019    Anxiety with depression; cont same meds. Exercise w stress reduction.   -     T4, free; Future; Expected date: 05/17/2019  -     T3, free; Future; Expected date: 05/17/2019  -     TSH; Future; Expected date: 05/17/2019    Atypical chest pain; can last 1 hr at time w stress. Refer to cardiology Dr Eastman; add ASA prior; to ER if worsens.     Other orders  -      levothyroxine (SYNTHROID) 50 MCG tablet; Take 1 tablet (50 mcg total) by mouth once daily.  Dispense: 90 tablet; Refill: 1

## 2019-07-03 ENCOUNTER — PATIENT OUTREACH (OUTPATIENT)
Dept: ADMINISTRATIVE | Facility: HOSPITAL | Age: 49
End: 2019-07-03

## 2019-07-03 NOTE — LETTER
July 3, 2019    Rashid Del Valle  [address] [facility] [address]             [facility]  [address]  [facility] [address]  Phone: [phone] Dear Mr. Del Valle:    [facility] is committed to your overall health.  To help you get the most out of each of your visits, we will review your information to make sure you are up to date on all of your recommended tests and/or procedures.      Dr. Matteo Torre MD has found that your chart shows you may be due for     Health Maintenance Due   Topic    Foot Exam     Eye Exam     TETANUS VACCINE     Pneumococcal Vaccine (Medium Risk) (1 of 1 - PPSV23)    Urine Microalbumin        If you have had any of the above done at another facility, please bring the records or information with you so that your record at [facility] will be complete.  If you would like to schedule any of these, please contact me.    If you are currently taking medication, please bring it with you to your appointment for review.    Also, if you have any type of Advanced Directives, please bring them with you to your office visit so we may scan them into your chart.    MD Bessy Rowe LPN Clinical Care Coordinator  [facility]  [address] [facility] [address]  [place] [id number] [phone] (p)   [phone] (f)

## 2019-07-03 NOTE — PROGRESS NOTES
Pre-visit Health Maintenance Review 07/03/2019    Future Appointments   Date Time Provider Department Center   7/10/2019  7:30 AM [facility]ORATORY [facility]   7/17/2019  9:50 AM Matteo Torre MD [facility] MED [facility]       Health Maintenance Due   Topic    Foot Exam     Eye Exam     TETANUS VACCINE     Pneumococcal Vaccine (Medium Risk) (1 of 1 - PPSV23)    Urine Microalbumin

## 2019-07-17 ENCOUNTER — OFFICE VISIT (OUTPATIENT)
Dept: FAMILY MEDICINE | Facility: CLINIC | Age: 49
End: 2019-07-17
Payer: COMMERCIAL

## 2019-07-17 ENCOUNTER — TELEPHONE (OUTPATIENT)
Dept: FAMILY MEDICINE | Facility: CLINIC | Age: 49
End: 2019-07-17

## 2019-07-17 ENCOUNTER — LAB VISIT (OUTPATIENT)
Dept: LAB | Facility: HOSPITAL | Age: 49
End: 2019-07-17
Attending: INTERNAL MEDICINE
Payer: COMMERCIAL

## 2019-07-17 VITALS
DIASTOLIC BLOOD PRESSURE: 84 MMHG | SYSTOLIC BLOOD PRESSURE: 120 MMHG | BODY MASS INDEX: 33.58 KG/M2 | WEIGHT: 239.88 LBS | HEART RATE: 71 BPM | HEIGHT: 71 IN | TEMPERATURE: 98 F | OXYGEN SATURATION: 95 %

## 2019-07-17 DIAGNOSIS — R07.89 ATYPICAL CHEST PAIN: ICD-10-CM

## 2019-07-17 DIAGNOSIS — E03.9 HYPOTHYROIDISM, UNSPECIFIED TYPE: ICD-10-CM

## 2019-07-17 DIAGNOSIS — F41.8 ANXIETY WITH DEPRESSION: ICD-10-CM

## 2019-07-17 DIAGNOSIS — E03.9 ACQUIRED HYPOTHYROIDISM: ICD-10-CM

## 2019-07-17 DIAGNOSIS — F41.1 GAD (GENERALIZED ANXIETY DISORDER): ICD-10-CM

## 2019-07-17 DIAGNOSIS — E11.9 TYPE 2 DIABETES MELLITUS WITHOUT COMPLICATION, WITHOUT LONG-TERM CURRENT USE OF INSULIN: ICD-10-CM

## 2019-07-17 DIAGNOSIS — I10 ESSENTIAL HYPERTENSION: ICD-10-CM

## 2019-07-17 DIAGNOSIS — E11.9 TYPE 2 DIABETES MELLITUS WITHOUT COMPLICATION, WITHOUT LONG-TERM CURRENT USE OF INSULIN: Primary | ICD-10-CM

## 2019-07-17 DIAGNOSIS — E66.9 OBESITY (BMI 30.0-34.9): ICD-10-CM

## 2019-07-17 DIAGNOSIS — F41.0 PANIC DISORDER: ICD-10-CM

## 2019-07-17 DIAGNOSIS — E78.2 MIXED HYPERLIPIDEMIA: ICD-10-CM

## 2019-07-17 DIAGNOSIS — E03.9 ACQUIRED HYPOTHYROIDISM: Primary | ICD-10-CM

## 2019-07-17 DIAGNOSIS — Z82.49 FAMILY HISTORY OF HEART DISEASE: ICD-10-CM

## 2019-07-17 PROBLEM — J18.9 PNEUMONIA OF BOTH UPPER LOBES DUE TO INFECTIOUS ORGANISM: Status: RESOLVED | Noted: 2018-12-29 | Resolved: 2019-07-17

## 2019-07-17 LAB
ALBUMIN SERPL BCP-MCNC: 4 G/DL (ref 3.5–5.2)
ALP SERPL-CCNC: 63 U/L (ref 55–135)
ALT SERPL W/O P-5'-P-CCNC: 18 U/L (ref 10–44)
ANION GAP SERPL CALC-SCNC: 12 MMOL/L (ref 8–16)
AST SERPL-CCNC: 15 U/L (ref 10–40)
BILIRUB SERPL-MCNC: 0.3 MG/DL (ref 0.1–1)
BUN SERPL-MCNC: 17 MG/DL (ref 6–20)
CALCIUM SERPL-MCNC: 9.7 MG/DL (ref 8.7–10.5)
CHLORIDE SERPL-SCNC: 99 MMOL/L (ref 95–110)
CHOLEST SERPL-MCNC: 133 MG/DL (ref 120–199)
CHOLEST/HDLC SERPL: 4.4 {RATIO} (ref 2–5)
CO2 SERPL-SCNC: 27 MMOL/L (ref 23–29)
CREAT SERPL-MCNC: 1.1 MG/DL (ref 0.5–1.4)
EST. GFR  (AFRICAN AMERICAN): >60 ML/MIN/1.73 M^2
EST. GFR  (NON AFRICAN AMERICAN): >60 ML/MIN/1.73 M^2
ESTIMATED AVG GLUCOSE: 192 MG/DL (ref 68–131)
GLUCOSE SERPL-MCNC: 227 MG/DL (ref 70–110)
HBA1C MFR BLD HPLC: 8.3 % (ref 4–5.6)
HDLC SERPL-MCNC: 30 MG/DL (ref 40–75)
HDLC SERPL: 22.6 % (ref 20–50)
LDLC SERPL CALC-MCNC: 46.2 MG/DL (ref 63–159)
NONHDLC SERPL-MCNC: 103 MG/DL
POTASSIUM SERPL-SCNC: 4.3 MMOL/L (ref 3.5–5.1)
PROT SERPL-MCNC: 7.5 G/DL (ref 6–8.4)
SODIUM SERPL-SCNC: 138 MMOL/L (ref 136–145)
T3FREE SERPL-MCNC: 3.1 PG/ML (ref 2.3–4.2)
T4 FREE SERPL-MCNC: 1.11 NG/DL (ref 0.71–1.51)
THYROPEROXIDASE IGG SERPL-ACNC: <6 IU/ML
TRIGL SERPL-MCNC: 284 MG/DL (ref 30–150)
TSH SERPL DL<=0.005 MIU/L-ACNC: 5.42 UIU/ML (ref 0.4–4)

## 2019-07-17 PROCEDURE — 80061 LIPID PANEL: CPT

## 2019-07-17 PROCEDURE — 3074F PR MOST RECENT SYSTOLIC BLOOD PRESSURE < 130 MM HG: ICD-10-PCS | Mod: CPTII,S$GLB,, | Performed by: INTERNAL MEDICINE

## 2019-07-17 PROCEDURE — 93005 ELECTROCARDIOGRAM TRACING: CPT | Mod: S$GLB,,, | Performed by: INTERNAL MEDICINE

## 2019-07-17 PROCEDURE — 36415 COLL VENOUS BLD VENIPUNCTURE: CPT | Mod: PN

## 2019-07-17 PROCEDURE — 3008F PR BODY MASS INDEX (BMI) DOCUMENTED: ICD-10-PCS | Mod: CPTII,S$GLB,, | Performed by: INTERNAL MEDICINE

## 2019-07-17 PROCEDURE — 93005 EKG 12-LEAD: ICD-10-PCS | Mod: S$GLB,,, | Performed by: INTERNAL MEDICINE

## 2019-07-17 PROCEDURE — 84445 ASSAY OF TSI GLOBULIN: CPT

## 2019-07-17 PROCEDURE — 84443 ASSAY THYROID STIM HORMONE: CPT

## 2019-07-17 PROCEDURE — 99214 PR OFFICE/OUTPT VISIT, EST, LEVL IV, 30-39 MIN: ICD-10-PCS | Mod: S$GLB,,, | Performed by: INTERNAL MEDICINE

## 2019-07-17 PROCEDURE — 80053 COMPREHEN METABOLIC PANEL: CPT

## 2019-07-17 PROCEDURE — 3008F BODY MASS INDEX DOCD: CPT | Mod: CPTII,S$GLB,, | Performed by: INTERNAL MEDICINE

## 2019-07-17 PROCEDURE — 83036 HEMOGLOBIN GLYCOSYLATED A1C: CPT

## 2019-07-17 PROCEDURE — 84481 FREE ASSAY (FT-3): CPT

## 2019-07-17 PROCEDURE — 3045F PR MOST RECENT HEMOGLOBIN A1C LEVEL 7.0-9.0%: ICD-10-PCS | Mod: CPTII,S$GLB,, | Performed by: INTERNAL MEDICINE

## 2019-07-17 PROCEDURE — 3079F DIAST BP 80-89 MM HG: CPT | Mod: CPTII,S$GLB,, | Performed by: INTERNAL MEDICINE

## 2019-07-17 PROCEDURE — 99999 PR PBB SHADOW E&M-EST. PATIENT-LVL III: ICD-10-PCS | Mod: PBBFAC,,, | Performed by: INTERNAL MEDICINE

## 2019-07-17 PROCEDURE — 84439 ASSAY OF FREE THYROXINE: CPT

## 2019-07-17 PROCEDURE — 3079F PR MOST RECENT DIASTOLIC BLOOD PRESSURE 80-89 MM HG: ICD-10-PCS | Mod: CPTII,S$GLB,, | Performed by: INTERNAL MEDICINE

## 2019-07-17 PROCEDURE — 93010 EKG 12-LEAD: ICD-10-PCS | Mod: S$GLB,,, | Performed by: INTERNAL MEDICINE

## 2019-07-17 PROCEDURE — 99214 OFFICE O/P EST MOD 30 MIN: CPT | Mod: S$GLB,,, | Performed by: INTERNAL MEDICINE

## 2019-07-17 PROCEDURE — 86376 MICROSOMAL ANTIBODY EACH: CPT

## 2019-07-17 PROCEDURE — 3074F SYST BP LT 130 MM HG: CPT | Mod: CPTII,S$GLB,, | Performed by: INTERNAL MEDICINE

## 2019-07-17 PROCEDURE — 99999 PR PBB SHADOW E&M-EST. PATIENT-LVL III: CPT | Mod: PBBFAC,,, | Performed by: INTERNAL MEDICINE

## 2019-07-17 PROCEDURE — 93010 ELECTROCARDIOGRAM REPORT: CPT | Mod: S$GLB,,, | Performed by: INTERNAL MEDICINE

## 2019-07-17 PROCEDURE — 3045F PR MOST RECENT HEMOGLOBIN A1C LEVEL 7.0-9.0%: CPT | Mod: CPTII,S$GLB,, | Performed by: INTERNAL MEDICINE

## 2019-07-17 NOTE — PROGRESS NOTES
Subjective:       Patient ID: Rashid Del Valle is a 48 y.o. male.    Chief Complaint: Hypertension (Routine follow up )    HPI  here today for routine follow-up and reassessment but labs not done.  DESI: on multiple meds for anxiety; sees Dr Abarca w f/u next week; atypical chest pain suspect more likely from panic ds then cardiac; but CAD needs to be r/o as risk factors present; had made consult last visit in 5/17/2019 for him to see Dr Eastman cardiology for further evaluation atypical CP which could last around 1 hr at times of stress; has not made apt yet to see him; will help pt make apt to see him before his checkout today; To ER if worsens.  04/01/2019 patient reportedly had normal EKG.  Will repeat EKG today.  Atypical chest pain today is noted as reportedly lasting 2 hr when stressed as a pressure/heaviness sensation the entire chest radiating down his upper extremities.  Averaging 1-2 times per day.  Also gets some sweating with the chest pain; lies down and eventually clears; rare palpitation also noted. 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  On  mg a day; to call Dr Abarca about any med changes; to use hydroxyzine 50 mg every 6 hrs as needed for anxiety/chest pain. He does need to cut back on caffeine; he needs to wean off chocolate and Coke as soft drinks.  Needs to schedule follow-up appointment to see his psychiatrist Dr. Abarca for assistance with all his anxiety issues to see if any medication changes are needed.    Diabetes mellitus type 2; trying to adhere to a diabetic diet; blood sugars at home have been averaging-  140's.; on metformin. Can increase to 500 mg 2x a day.   Mixed hyperlipidemia:  On low-fat high-fiber diet; tolerating rosuvastatin/Crestor fine at 20 mg a day.  Obesity:  Goal of minimum 5 times a week exercise and 25-30 minutes along with caloric restriction to help his weight come down  Essential hypertension;  "tries to watch his salt intake; blood pressure manually is controlled 120/84; blood pressure at home has been averaging-  Needs to obtain biceps cuff for home BP monitoring still.  Hypothyroidism: takes levothyroxine 50 mcg a day. Tolerating fine.     Review of Systems   Constitutional: Negative for appetite change and unexpected weight change.   HENT: Negative for congestion, postnasal drip, rhinorrhea and sinus pressure.         Denies seasonal allergies, or perennial allergies   Eyes: Negative for discharge and itching.   Respiratory: Negative for cough, chest tightness, shortness of breath and wheezing.    Cardiovascular: Negative for chest pain, palpitations and leg swelling.   Gastrointestinal: Negative for abdominal pain, blood in stool, constipation, diarrhea, nausea and vomiting.   Endocrine: Negative for polydipsia, polyphagia and polyuria.   Genitourinary: Negative for dysuria and hematuria.   Musculoskeletal: Negative for arthralgias and myalgias.   Skin: Negative for rash.   Allergic/Immunologic: Negative for environmental allergies and food allergies.   Neurological: Negative for tremors, seizures and headaches.   Hematological: Negative for adenopathy. Does not bruise/bleed easily.   Psychiatric/Behavioral:        Denies anxiety or depression.       Objective:      Vitals:    07/17/19 1008   BP: 120/84   Pulse: 71   Temp: 97.8 °F (36.6 °C)   TempSrc: Oral   SpO2: 95%   Weight: 108.8 kg (239 lb 13.8 oz)   Height: 5' 11" (1.803 m)     Body mass index is 33.45 kg/m².    Physical Exam   Constitutional: He is oriented to person, place, and time. He appears well-developed and well-nourished.   HENT:   Head: Normocephalic and atraumatic.   Eyes: EOM are normal.   Neck: Normal range of motion. Neck supple. No thyromegaly present.   No carotid bruits heard.    Cardiovascular: Normal rate, regular rhythm and normal heart sounds. Exam reveals no gallop.   No murmur heard.  Pulmonary/Chest: Effort normal and " breath sounds normal. No respiratory distress. He has no wheezes. He has no rales.   Abdominal: Soft. Bowel sounds are normal. He exhibits no distension. There is no tenderness. There is no rebound and no guarding.   Musculoskeletal: Normal range of motion. He exhibits no edema.   Lymphadenopathy:     He has no cervical adenopathy.   Neurological: He is alert and oriented to person, place, and time.   Moves all 4 extremities fine.   Skin: No rash noted.   Psychiatric: He has a normal mood and affect. His behavior is normal. Thought content normal.   Vitals reviewed.      Assessment:       1. Type 2 diabetes mellitus without complication, without long-term current use of insulin    2. Mixed hyperlipidemia    3. Obesity (BMI 30.0-34.9)    4. DESI (generalized anxiety disorder)    5. Panic disorder    6. Atypical chest pain    7. Family history of heart disease    8. Essential hypertension    9. Acquired hypothyroidism        Plan:       Type 2 diabetes mellitus without complication, without long-term current use of insulin; trial increase metformin to 500 mg ER 2x a day. Adhere to diet better.  Exercise regularly with weight reduction will also help; hemoglobin A1c to be drawn today; fasting blood sugar before follow-up  -     Hemoglobin A1c; Future; Expected date: 07/17/2019    Mixed hyperlipidemia. Maintain low fat high fiber diet, exercise regularly. Cont rosuvastatin; lipid levels pending    Obesity (BMI 30.0-34.9). Caloric restriction w regular exercise and weight reduction.    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 basis, as well as mirtazapine, Paxil and generic Seroquel; needs his medications reviewed and possibly adjusted by Psychiatry.    Panic disorder; use hypdroxyzine 50 mg every 6-8 hrs as needed for anxiety.  Anxiety meds as per Dr Abarca; needs  to schedule a follow-up around for evaluation for any possible medication changes for further assistance and reassessment with his anxiety and suspected panic disorder    Atypical chest pain; EKG today; ASA daily; to see Dr Eastman cardiology for further evaluation of chest pain as CV risk factors present as well. R/o CAD. To ER if worsens; To also call his psychiatrist about any further med changes.   EKG office today: NSR at 69 BPM; normal EKG. No significant change from 4/1/19 EKG.          Consult placed to cardiology Dr Eastman w last 5/17/2019 visit; will help pt make apt to see him today before he leaves to r/o CAD w atypical CP and CV risk factors.  Patient with obesity, age of 48, essential hypertension, diabetes mellitus type 2 and family history of heart disease  -     IN OFFICE EKG 12-LEAD (to Muse)    Family history of heart disease  -     IN OFFICE EKG 12-LEAD (to Muse)    Essential hypertension. Maintain < 2 Gm Na a day diet, and monitor BP at home; keep a log.    Acquired hypothyroidism; levothyroxine 50 mcg a day to continue; TFT pending.

## 2019-07-17 NOTE — PATIENT INSTRUCTIONS
Type 2 diabetes mellitus without complication, without long-term current use of insulin; trial increase metformin to 500 mg ER 2x a day. Adhere to diet better.   -     Hemoglobin A1c; Future; Expected date: 07/17/2019    Mixed hyperlipidemia.Maintain low fat high fiber diet, exercise regularly. Cont rosuvastatin; lipid levels pending    Obesity (BMI 30.0-34.9).Caloric restriction w regular exercise and weight reduction.    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 Meds as per dr Abarca    Atypical chest pain; EKG today; ASA daily; to see Dr Eastman cardioklogy for further evaluation of chest pain as CV risk factors present as well. R/o CAD. To ER if worsens; To also call his psychiatrist about any further med changes. EKG office today: NSR at 69 BPM; normal EKG. No significant change from 4/1/19 EKG.          Consult placed to cardiology Dr Jaren barrios last 5/2019 visit; will help pt make apt to see him today before he leaves to r/o CAD w atypical CP and CV risk factors.   -     IN OFFICE EKG 12-LEAD (to Muse)    Family history of heart disease  -     IN OFFICE EKG 12-LEAD (to Muse)    Essential hypertension.Maintain < 2 Gm Na a day diet, and monitor BP at home; keep a log.    Acquired hypothyroidism; levothyroxine 50 mcg a day to continue; TFT pending.

## 2019-07-18 DIAGNOSIS — R73.9 HYPERGLYCEMIA: Primary | ICD-10-CM

## 2019-07-18 DIAGNOSIS — E11.9 TYPE 2 DIABETES MELLITUS WITHOUT COMPLICATION, WITHOUT LONG-TERM CURRENT USE OF INSULIN: ICD-10-CM

## 2019-07-18 RX ORDER — LEVOTHYROXINE SODIUM 125 UG/1
TABLET ORAL
Qty: 45 TABLET | Refills: 1 | Status: SHIPPED | OUTPATIENT
Start: 2019-07-17 | End: 2019-08-13

## 2019-07-18 RX ORDER — METFORMIN HYDROCHLORIDE 500 MG/1
TABLET, EXTENDED RELEASE ORAL
Qty: 180 TABLET | Refills: 1 | Status: SHIPPED | OUTPATIENT
Start: 2019-07-17 | End: 2019-10-14

## 2019-07-18 NOTE — TELEPHONE ENCOUNTER
Attempted to contact patient in regards to lab results. Call disconnected. Called back to review results. Patient verbalized understanding

## 2019-07-18 NOTE — TELEPHONE ENCOUNTER
----- Message from Jenni Mayo sent at 7/18/2019  1:55 PM CDT -----  Contact: [phone]  Patient is returning nurse's phone call.  Please call patient back at [phone].

## 2019-07-22 LAB — TSI SER-ACNC: <0.1 IU/L

## 2019-07-29 ENCOUNTER — PATIENT OUTREACH (OUTPATIENT)
Dept: ADMINISTRATIVE | Facility: HOSPITAL | Age: 49
End: 2019-07-29

## 2019-08-09 ENCOUNTER — OFFICE VISIT (OUTPATIENT)
Dept: CARDIOLOGY | Facility: CLINIC | Age: 49
End: 2019-08-09
Payer: COMMERCIAL

## 2019-08-09 VITALS
HEIGHT: 71 IN | WEIGHT: 243.63 LBS | BODY MASS INDEX: 34.11 KG/M2 | HEART RATE: 68 BPM | DIASTOLIC BLOOD PRESSURE: 78 MMHG | SYSTOLIC BLOOD PRESSURE: 108 MMHG

## 2019-08-09 DIAGNOSIS — I10 ESSENTIAL HYPERTENSION: ICD-10-CM

## 2019-08-09 DIAGNOSIS — R06.02 SOB (SHORTNESS OF BREATH): ICD-10-CM

## 2019-08-09 DIAGNOSIS — E66.9 OBESITY (BMI 30.0-34.9): ICD-10-CM

## 2019-08-09 DIAGNOSIS — E78.2 MIXED HYPERLIPIDEMIA: Primary | ICD-10-CM

## 2019-08-09 DIAGNOSIS — R07.89 OTHER CHEST PAIN: ICD-10-CM

## 2019-08-09 PROCEDURE — 3008F PR BODY MASS INDEX (BMI) DOCUMENTED: ICD-10-PCS | Mod: CPTII,S$GLB,, | Performed by: INTERNAL MEDICINE

## 2019-08-09 PROCEDURE — 3008F BODY MASS INDEX DOCD: CPT | Mod: CPTII,S$GLB,, | Performed by: INTERNAL MEDICINE

## 2019-08-09 PROCEDURE — 99204 PR OFFICE/OUTPT VISIT, NEW, LEVL IV, 45-59 MIN: ICD-10-PCS | Mod: S$GLB,,, | Performed by: INTERNAL MEDICINE

## 2019-08-09 PROCEDURE — 3078F PR MOST RECENT DIASTOLIC BLOOD PRESSURE < 80 MM HG: ICD-10-PCS | Mod: CPTII,S$GLB,, | Performed by: INTERNAL MEDICINE

## 2019-08-09 PROCEDURE — 3074F SYST BP LT 130 MM HG: CPT | Mod: CPTII,S$GLB,, | Performed by: INTERNAL MEDICINE

## 2019-08-09 PROCEDURE — 99999 PR PBB SHADOW E&M-EST. PATIENT-LVL IV: ICD-10-PCS | Mod: PBBFAC,,, | Performed by: INTERNAL MEDICINE

## 2019-08-09 PROCEDURE — 99204 OFFICE O/P NEW MOD 45 MIN: CPT | Mod: S$GLB,,, | Performed by: INTERNAL MEDICINE

## 2019-08-09 PROCEDURE — 99999 PR PBB SHADOW E&M-EST. PATIENT-LVL IV: CPT | Mod: PBBFAC,,, | Performed by: INTERNAL MEDICINE

## 2019-08-09 PROCEDURE — 3078F DIAST BP <80 MM HG: CPT | Mod: CPTII,S$GLB,, | Performed by: INTERNAL MEDICINE

## 2019-08-09 PROCEDURE — 3074F PR MOST RECENT SYSTOLIC BLOOD PRESSURE < 130 MM HG: ICD-10-PCS | Mod: CPTII,S$GLB,, | Performed by: INTERNAL MEDICINE

## 2019-08-09 RX ORDER — PANTOPRAZOLE SODIUM 40 MG/1
TABLET, DELAYED RELEASE ORAL
Refills: 3 | COMMUNITY
Start: 2019-07-30 | End: 2019-10-14

## 2019-08-09 NOTE — PROGRESS NOTES
Subjective:    Patient ID:  Rashid Del Valle is a 48 y.o. male who presents for evaluation of Consult (ref from pcp)      HPI48 yo WM with hx of CP and SOB for over a year. Symptoms can occur at rest and with exertion but has not limited activity. Resting EKG WNL. Sometimes can just be sitting and feels he can't get a deep breath. Does admit to a lot of stress at work.    Review of Systems   Constitution: Negative for decreased appetite, fever, malaise/fatigue, weight gain and weight loss.   HENT: Negative for hearing loss and nosebleeds.    Eyes: Negative for visual disturbance.   Cardiovascular: Positive for chest pain and dyspnea on exertion. Negative for claudication, cyanosis, irregular heartbeat, leg swelling, near-syncope, orthopnea, palpitations, paroxysmal nocturnal dyspnea and syncope.   Respiratory: Positive for shortness of breath. Negative for cough, hemoptysis, sleep disturbances due to breathing, snoring and wheezing.    Endocrine: Negative for cold intolerance, heat intolerance, polydipsia and polyuria.   Hematologic/Lymphatic: Negative for adenopathy and bleeding problem. Does not bruise/bleed easily.   Skin: Negative for color change, itching, poor wound healing, rash and suspicious lesions.   Musculoskeletal: Negative for arthritis, back pain, falls, joint pain, joint swelling, muscle cramps, muscle weakness and myalgias.   Gastrointestinal: Negative for bloating, abdominal pain, change in bowel habit, constipation, flatus, heartburn, hematemesis, hematochezia, hemorrhoids, jaundice, melena, nausea and vomiting.   Genitourinary: Negative for bladder incontinence, decreased libido, frequency, hematuria, hesitancy and urgency.   Neurological: Negative for brief paralysis, difficulty with concentration, excessive daytime sleepiness, dizziness, focal weakness, headaches, light-headedness, loss of balance, numbness, vertigo and weakness.   Psychiatric/Behavioral: Negative for altered mental status,  "depression and memory loss. The patient does not have insomnia and is not nervous/anxious.    Allergic/Immunologic: Negative for environmental allergies, hives and persistent infections.        Objective:    Physical Exam   Constitutional: He is oriented to person, place, and time. He appears well-developed and well-nourished. No distress.   /78   Pulse 68   Ht 5' 11" (1.803 m)   Wt 110.5 kg (243 lb 9.7 oz)   BMI 33.98 kg/m²      HENT:   Head: Normocephalic and atraumatic.   Eyes: Pupils are equal, round, and reactive to light. Conjunctivae and lids are normal. Right eye exhibits no discharge. No scleral icterus.   Neck: Normal range of motion. Neck supple. No JVD present. No tracheal deviation present. No thyromegaly present.   Cardiovascular: Normal rate, regular rhythm, S1 normal, S2 normal, normal heart sounds and intact distal pulses. Exam reveals no gallop and no friction rub.   No murmur heard.  Pulses:       Carotid pulses are 2+ on the right side, and 2+ on the left side.       Radial pulses are 2+ on the right side, and 2+ on the left side.        Femoral pulses are 2+ on the right side, and 2+ on the left side.       Popliteal pulses are 2+ on the right side, and 2+ on the left side.        Dorsalis pedis pulses are 2+ on the right side, and 2+ on the left side.        Posterior tibial pulses are 2+ on the right side, and 2+ on the left side.   Pulmonary/Chest: Effort normal and breath sounds normal. No respiratory distress. He has no wheezes. He has no rales. He exhibits no tenderness.   Abdominal: Soft. Bowel sounds are normal. He exhibits no distension and no mass. There is no hepatosplenomegaly or hepatomegaly. There is no tenderness. There is no rebound and no guarding.   Musculoskeletal: Normal range of motion. He exhibits no edema or tenderness.   Lymphadenopathy:     He has no cervical adenopathy.   Neurological: He is alert and oriented to person, place, and time. He has normal " reflexes. No cranial nerve deficit. Coordination normal.   Skin: Skin is warm and dry. No rash noted. He is not diaphoretic. No erythema. No pallor.   Psychiatric: He has a normal mood and affect. His speech is normal and behavior is normal. Judgment and thought content normal. Cognition and memory are normal.         Assessment:       1. Mixed hyperlipidemia    2. Essential hypertension    3. Obesity (BMI 30.0-34.9)    4. SOB (shortness of breath)    5. Other chest pain         Plan:     Patient advised to modify risk factors such as weight, exercise, diet,  tobacco and alcohol exposure       Orders Placed This Encounter   Procedures    Echocardiogram stress test (Cupid Only)     Follow up if symptoms worsen or fail to improve, for Will call with results.

## 2019-08-09 NOTE — LETTER
August 9, 2019      Matteo Torre MD  [address]  [facility] [address]           [facility]  [address] [facility] [address] [place] [address]  Phone: [phone]          Patient: Rashid Del Valle   MR Number: [id number]   YOB: 1970   Date of Visit: 8/9/2019       Dear Dr. Matteo Torre:    Thank you for referring Rashid Del Valle to me for evaluation. Attached you will find relevant portions of my assessment and plan of care.    If you have questions, please do not hesitate to call me. I look forward to following Rashid Del Valle along with you.    Sincerely,    Antonio Faith Jr., MD    Enclosure  CC:  No Recipients    If you would like to receive this communication electronically, please contact [email or web address] or [phone] to request more information on [employer] Link access.    For providers and/or their staff who would like to refer a patient to [facility], please contact us through our one-stop-shop provider referral line, [facility], at [phone].    If you feel you have received this communication in error or would no longer like to receive these types of communications, please e-mail [email or web address]

## 2019-08-13 ENCOUNTER — TELEPHONE (OUTPATIENT)
Dept: FAMILY MEDICINE | Facility: CLINIC | Age: 49
End: 2019-08-13

## 2019-08-13 ENCOUNTER — OFFICE VISIT (OUTPATIENT)
Dept: FAMILY MEDICINE | Facility: CLINIC | Age: 49
End: 2019-08-13
Payer: COMMERCIAL

## 2019-08-13 VITALS
WEIGHT: 242.19 LBS | DIASTOLIC BLOOD PRESSURE: 94 MMHG | OXYGEN SATURATION: 97 % | TEMPERATURE: 99 F | SYSTOLIC BLOOD PRESSURE: 122 MMHG | HEART RATE: 78 BPM | BODY MASS INDEX: 33.91 KG/M2 | HEIGHT: 71 IN

## 2019-08-13 DIAGNOSIS — E66.9 OBESITY (BMI 30.0-34.9): ICD-10-CM

## 2019-08-13 DIAGNOSIS — E78.2 MIXED HYPERLIPIDEMIA: ICD-10-CM

## 2019-08-13 DIAGNOSIS — F41.1 GENERALIZED ANXIETY DISORDER: ICD-10-CM

## 2019-08-13 DIAGNOSIS — R73.9 HYPERGLYCEMIA: ICD-10-CM

## 2019-08-13 DIAGNOSIS — R07.9 RECURRENT CHEST PAIN: ICD-10-CM

## 2019-08-13 DIAGNOSIS — E11.9 TYPE 2 DIABETES MELLITUS WITHOUT COMPLICATION, WITHOUT LONG-TERM CURRENT USE OF INSULIN: Primary | ICD-10-CM

## 2019-08-13 DIAGNOSIS — E03.9 ACQUIRED HYPOTHYROIDISM: ICD-10-CM

## 2019-08-13 LAB — GLUCOSE SERPL-MCNC: 217 MG/DL (ref 70–110)

## 2019-08-13 PROCEDURE — 82962 POCT GLUCOSE, HAND-HELD DEVICE: ICD-10-PCS | Mod: S$GLB,,, | Performed by: INTERNAL MEDICINE

## 2019-08-13 PROCEDURE — 3008F PR BODY MASS INDEX (BMI) DOCUMENTED: ICD-10-PCS | Mod: CPTII,S$GLB,, | Performed by: INTERNAL MEDICINE

## 2019-08-13 PROCEDURE — 99999 PR PBB SHADOW E&M-EST. PATIENT-LVL III: CPT | Mod: PBBFAC,,, | Performed by: INTERNAL MEDICINE

## 2019-08-13 PROCEDURE — 3080F PR MOST RECENT DIASTOLIC BLOOD PRESSURE >= 90 MM HG: ICD-10-PCS | Mod: CPTII,S$GLB,, | Performed by: INTERNAL MEDICINE

## 2019-08-13 PROCEDURE — 3074F SYST BP LT 130 MM HG: CPT | Mod: CPTII,S$GLB,, | Performed by: INTERNAL MEDICINE

## 2019-08-13 PROCEDURE — 3008F BODY MASS INDEX DOCD: CPT | Mod: CPTII,S$GLB,, | Performed by: INTERNAL MEDICINE

## 2019-08-13 PROCEDURE — 99214 OFFICE O/P EST MOD 30 MIN: CPT | Mod: S$GLB,,, | Performed by: INTERNAL MEDICINE

## 2019-08-13 PROCEDURE — 3074F PR MOST RECENT SYSTOLIC BLOOD PRESSURE < 130 MM HG: ICD-10-PCS | Mod: CPTII,S$GLB,, | Performed by: INTERNAL MEDICINE

## 2019-08-13 PROCEDURE — 82962 GLUCOSE BLOOD TEST: CPT | Mod: S$GLB,,, | Performed by: INTERNAL MEDICINE

## 2019-08-13 PROCEDURE — 3045F PR MOST RECENT HEMOGLOBIN A1C LEVEL 7.0-9.0%: ICD-10-PCS | Mod: CPTII,S$GLB,, | Performed by: INTERNAL MEDICINE

## 2019-08-13 PROCEDURE — 99214 PR OFFICE/OUTPT VISIT, EST, LEVL IV, 30-39 MIN: ICD-10-PCS | Mod: S$GLB,,, | Performed by: INTERNAL MEDICINE

## 2019-08-13 PROCEDURE — 3080F DIAST BP >= 90 MM HG: CPT | Mod: CPTII,S$GLB,, | Performed by: INTERNAL MEDICINE

## 2019-08-13 PROCEDURE — 99999 PR PBB SHADOW E&M-EST. PATIENT-LVL III: ICD-10-PCS | Mod: PBBFAC,,, | Performed by: INTERNAL MEDICINE

## 2019-08-13 PROCEDURE — 3045F PR MOST RECENT HEMOGLOBIN A1C LEVEL 7.0-9.0%: CPT | Mod: CPTII,S$GLB,, | Performed by: INTERNAL MEDICINE

## 2019-08-13 RX ORDER — LEVOTHYROXINE SODIUM 75 UG/1
75 TABLET ORAL DAILY
Qty: 90 TABLET | Refills: 1 | Status: SHIPPED | OUTPATIENT
Start: 2019-08-13 | End: 2020-02-16

## 2019-08-13 NOTE — PROGRESS NOTES
Subjective:       Patient ID: Rashid De lValle is a 48 y.o. male.    Chief Complaint: Follow-up (LOV 07/17/2019)    HPI  overall patient is reportedly doing better.  Here for reassessment.  Diabetes mellitus type 2:  Watching his carbohydrates and taking his diabetic medications regular; tries to exercise to get his weight down with BMI 33.78 at present; on metformin 500 mg twice a day since last visit; last visit hemoglobin A1c after returned back elevated at 8.3 up from 7.0. Had chocolates earlier. ?..  Mixed hyperlipidemia:  On low-fat high-fiber diet or tries; tolerates rosuvastatin fine.  w HgA1C 8.3; POCT today 217.   Obesity:  Stressed the need for regular exercise 5 times a week with goal 25-30 minutes along with caloric restriction help his weight come down.  BMI is 33.78.  General anxiety disorder:  Knows to limit caffeine intake; is on paroxetine quetiapine and mirtazapine along with Celexa and clonazepam.  He also takes generic Depakote  Hypothyroidism:  Takes his levothyroxine appropriately he is on half of 125 mcg tablet daily.  GERD:  Knows not to take a bedtime snack; patient takes pantoprazole 40 mg daily    Review of Systems   Constitutional: Negative for appetite change and unexpected weight change.   HENT: Negative for congestion, postnasal drip, rhinorrhea and sinus pressure.         Denies seasonal allergies, or perennial allergies   Eyes: Negative for discharge and itching.   Respiratory: Positive for chest tightness and shortness of breath. Negative for cough and wheezing.         Mostly w exertion; has seen cardiology; Dr Faith; echo EST pending; keep f/u w him as directed.    Cardiovascular: Negative for chest pain, palpitations and leg swelling.   Gastrointestinal: Negative for abdominal pain, blood in stool, constipation, diarrhea, nausea and vomiting.   Endocrine: Positive for polyuria. Negative for polydipsia and polyphagia.   Genitourinary: Negative for dysuria and  "hematuria.   Musculoskeletal: Positive for myalgias. Negative for arthralgias.        Tylenol for pain.    Skin: Negative for rash.   Allergic/Immunologic: Negative for environmental allergies and food allergies.   Neurological: Negative for tremors, seizures and headaches.   Hematological: Negative for adenopathy. Does not bruise/bleed easily.   Psychiatric/Behavioral:        Anxiety w depression; on meds; sees Dr ALESSANDRO Abarca.        Objective:      Vitals:    08/13/19 1121   BP: (!) 122/94   BP Location: Right arm   Patient Position: Sitting   BP Method: Large (Manual)   Pulse: 78   Temp: 98.5 °F (36.9 °C)   TempSrc: Oral   SpO2: 97%   Weight: 109.8 kg (242 lb 2.8 oz)   Height: 5' 11" (1.803 m)     Body mass index is 33.78 kg/m².    Physical Exam   Constitutional: He is oriented to person, place, and time. He appears well-developed and well-nourished.   HENT:   Head: Normocephalic and atraumatic.   Eyes: EOM are normal.   Neck: Normal range of motion. Neck supple. No thyromegaly present.   No carotid bruits heard   Cardiovascular: Normal rate, regular rhythm and normal heart sounds. Exam reveals no gallop.   No murmur heard.  Pulmonary/Chest: Effort normal and breath sounds normal. No respiratory distress. He has no wheezes. He has no rales.   Abdominal: Soft. Bowel sounds are normal. He exhibits no distension. There is no tenderness. There is no rebound and no guarding.   Musculoskeletal: Normal range of motion. He exhibits no edema.   Lymphadenopathy:     He has no cervical adenopathy.   Neurological: He is alert and oriented to person, place, and time.   Moves all 4 extremities fine.   Skin: No rash noted.   Psychiatric: He has a normal mood and affect. His behavior is normal. Thought content normal.   Vitals reviewed.      Assessment:       1. Type 2 diabetes mellitus without complication, without long-term current use of insulin    2. Mixed hyperlipidemia    3. Acquired hypothyroidism    4. Obesity (BMI " 30.0-34.9)    5. Generalized anxiety disorder    6. Hyperglycemia    7. Recurrent chest pain        Plan:       Type 2 diabetes mellitus without complication, without long-term current use of insulin.Maintain a low carb diet; monitor CBG's at home; keep a log for review. Cont metformin 1000 mg 2x a day; add Jardiance 10 mg w each breakfast. Exercise w weight reduction; adhere to low carb diet.   -     POCT Glucose, Hand-Held Device  -     empagliflozin (JARDIANCE) 10 mg Tab; Take 10 mg by mouth daily with breakfast. Each morning  Dispense: 30 tablet; Refill: 2  -     Comprehensive metabolic panel; Future; Expected date: 08/13/2019  -     Hemoglobin A1c; Future; Expected date: 08/13/2019  -     Urinalysis; Future; Expected date: 08/13/2019    Hyperglycemia; adhere to diet a lot better; exercise w weight reduction. Add Jardiance 10 mg a day w breakfast. Continue metformin 1000 mg 2x a day.   -     empagliflozin (JARDIANCE) 10 mg Tab; Take 10 mg by mouth daily with breakfast. Each morning  Dispense: 30 tablet; Refill: 2  -     Comprehensive metabolic panel; Future; Expected date: 08/13/2019  -     Hemoglobin A1c; Future; Expected date: 08/13/2019    Mixed hyperlipidemia.Maintain low fat high fiber diet, exercise regularly. Decrease rosuvastatin to 20 mg 6x a week; add fish oil 1200 mg 2 a day. Limit dairy products more and alcohol.   -     Lipid panel; Future; Expected date: 08/13/2019  -     TSH; Future; Expected date: 08/13/2019  -     T4, free; Future; Expected date: 08/13/2019  -     T3, free; Future; Expected date: 08/13/2019  -     levothyroxine (SYNTHROID) 75 MCG tablet; Take 1 tablet (75 mcg total) by mouth once daily.  Dispense: 90 tablet; Refill: 1    Acquired hypothyroidism; increase levothyroxine from 1/2 of 125 mcg a day to 75 mcg a day.   -     TSH; Future; Expected date: 08/13/2019  -     T4, free; Future; Expected date: 08/13/2019  -     T3, free; Future; Expected date: 08/13/2019  -     levothyroxine  (SYNTHROID) 75 MCG tablet; Take 1 tablet (75 mcg total) by mouth once daily.  Dispense: 90 tablet; Refill: 1    Obesity (BMI 30.0-34.9).Caloric restriction w regular exercise and weight reduction.  -     TSH; Future; Expected date: 08/13/2019  -     T4, free; Future; Expected date: 08/13/2019  -     T3, free; Future; Expected date: 08/13/2019  -     levothyroxine (SYNTHROID) 75 MCG tablet; Take 1 tablet (75 mcg total) by mouth once daily.  Dispense: 90 tablet; Refill: 1    Generalized anxiety disorder; meds as per Dr ALESSANDRO Abarca; keep follow ups w him as directed.   -     TSH; Future; Expected date: 08/13/2019  -     T4, free; Future; Expected date: 08/13/2019  -     T3, free; Future; Expected date: 08/13/2019  -     levothyroxine (SYNTHROID) 75 MCG tablet; Take 1 tablet (75 mcg total) by mouth once daily.  Dispense: 90 tablet; Refill: 1    Recurrent chest pain; stress echo pending; seeing Dr Faith; keep his follow ups w him.

## 2019-08-13 NOTE — PATIENT INSTRUCTIONS
Type 2 diabetes mellitus without complication, without long-term current use of insulin.Maintain a low carb diet; monitor CBG's at home; keep a log for review. Cont metformin 1000 mg 2x a day; add Jardiance 10 mg w each breakfast. Exercise w weight reduction; adhere to low carb diet.   -     empagliflozin (JARDIANCE) 10 mg Tab; Take 10 mg by mouth daily with breakfast. Each morning  Dispense: 30 tablet; Refill: 2    Hyperglycemia; adhere to diet a lot better; exercise w weight reduction. Add Jardiance 10 mg a day w breakfast. Continue metformin 1000 mg 2x a day.   -     empagliflozin (JARDIANCE) 10 mg Tab; Take 10 mg by mouth daily with breakfast. Each morning  Dispense: 30 tablet; Refill: 2    Mixed hyperlipidemia.Maintain low fat high fiber diet, exercise regularly. Decrease rosuvastatin to 20 mg 6x a week; add fish oil 1200 mg 2 a day. Limit dairy products more and alcohol.     Acquired hypothyroidism; increase levothyroxine from 1/2 of 125 mcg a day to 75 mcg a day.     .Caloric restriction w regular exercise and weight reduction.    Generalized anxiety disorder; meds as per Dr ALESSANDRO Abarca; keep follow ups w him as directed.     Recurrent chest pain; stress echo pending; seeing Dr Faith; keep his follow ups w him.

## 2019-08-13 NOTE — TELEPHONE ENCOUNTER
Phoned patient to notify of scheduling date & time for nurse visit, no response lvm for patient to return call to  clinic with any questions or concerns in regards to scheduled OV.

## 2019-08-20 ENCOUNTER — CLINICAL SUPPORT (OUTPATIENT)
Dept: CARDIOLOGY | Facility: CLINIC | Age: 49
End: 2019-08-20
Attending: INTERNAL MEDICINE
Payer: COMMERCIAL

## 2019-08-20 ENCOUNTER — TELEPHONE (OUTPATIENT)
Dept: CARDIOLOGY | Facility: CLINIC | Age: 49
End: 2019-08-20

## 2019-08-20 VITALS — HEIGHT: 71 IN | WEIGHT: 242 LBS | BODY MASS INDEX: 33.88 KG/M2

## 2019-08-20 DIAGNOSIS — E66.9 OBESITY (BMI 30.0-34.9): ICD-10-CM

## 2019-08-20 DIAGNOSIS — R06.02 SOB (SHORTNESS OF BREATH): ICD-10-CM

## 2019-08-20 DIAGNOSIS — I10 ESSENTIAL HYPERTENSION: ICD-10-CM

## 2019-08-20 DIAGNOSIS — E78.2 MIXED HYPERLIPIDEMIA: ICD-10-CM

## 2019-08-20 DIAGNOSIS — R07.89 OTHER CHEST PAIN: ICD-10-CM

## 2019-08-20 LAB
ASCENDING AORTA: 3.48 CM
BSA FOR ECHO PROCEDURE: 2.34 M2
CV ECHO LV RWT: 0.46 CM
CV STRESS BASE HR: 76 BPM
DIASTOLIC BLOOD PRESSURE: 79 MMHG
DOP CALC LVOT AREA: 4 CM2
DOP CALC LVOT DIAMETER: 2.26 CM
DOP CALC LVOT PEAK VEL: 1.07 M/S
DOP CALC LVOT STROKE VOLUME: 83.24 CM3
DOP CALCLVOT PEAK VEL VTI: 20.76 CM
E WAVE DECELERATION TIME: 183.53 MSEC
E/A RATIO: 1.08
E/E' RATIO: 10.8 M/S
ECHO LV POSTERIOR WALL: 1.11 CM (ref 0.6–1.1)
FRACTIONAL SHORTENING: 28 % (ref 28–44)
INTERVENTRICULAR SEPTUM: 0.87 CM (ref 0.6–1.1)
IVRT: 0.13 MSEC
LA MAJOR: 4.7 CM
LA MINOR: 4.54 CM
LA WIDTH: 3.47 CM
LEFT ATRIUM SIZE: 3.6 CM
LEFT ATRIUM VOLUME INDEX: 21.4 ML/M2
LEFT ATRIUM VOLUME: 49.04 CM3
LEFT INTERNAL DIMENSION IN SYSTOLE: 3.47 CM (ref 2.1–4)
LEFT VENTRICLE DIASTOLIC VOLUME INDEX: 47.53 ML/M2
LEFT VENTRICLE DIASTOLIC VOLUME: 108.69 ML
LEFT VENTRICLE MASS INDEX: 74 G/M2
LEFT VENTRICLE SYSTOLIC VOLUME INDEX: 21.7 ML/M2
LEFT VENTRICLE SYSTOLIC VOLUME: 49.73 ML
LEFT VENTRICULAR INTERNAL DIMENSION IN DIASTOLE: 4.82 CM (ref 3.5–6)
LEFT VENTRICULAR MASS: 169.04 G
LV LATERAL E/E' RATIO: 10.13 M/S
LV SEPTAL E/E' RATIO: 11.57 M/S
MV PEAK A VEL: 0.75 M/S
MV PEAK E VEL: 0.81 M/S
OHS CV CPX 1 MINUTE RECOVERY HEART RATE: 94 BPM
OHS CV CPX 85 PERCENT MAX PREDICTED HEART RATE MALE: 145
OHS CV CPX ESTIMATED METS: 10
OHS CV CPX MAX PREDICTED HEART RATE: 171
OHS CV CPX PATIENT IS FEMALE: 0
OHS CV CPX PATIENT IS MALE: 1
OHS CV CPX PEAK DIASTOLIC BLOOD PRESSURE: 79 MMHG
OHS CV CPX PEAK HEAR RATE: 129 BPM
OHS CV CPX PEAK RATE PRESSURE PRODUCT: ABNORMAL
OHS CV CPX PEAK SYSTOLIC BLOOD PRESSURE: 135 MMHG
OHS CV CPX PERCENT MAX PREDICTED HEART RATE ACHIEVED: 75
OHS CV CPX RATE PRESSURE PRODUCT PRESENTING: 8664
PISA TR MAX VEL: 2.93 M/S
PULM VEIN S/D RATIO: 0.98
PV PEAK D VEL: 0.58 M/S
PV PEAK S VEL: 0.57 M/S
RA MAJOR: 5.21 CM
RA WIDTH: 2.62 CM
SINUS: 3.69 CM
STJ: 3.28 CM
STRESS ECHO POST EXERCISE DUR MIN: 5 MINUTES
STRESS ECHO POST EXERCISE DUR SEC: 46 SECONDS
SYSTOLIC BLOOD PRESSURE: 114 MMHG
TDI LATERAL: 0.08 M/S
TDI SEPTAL: 0.07 M/S
TDI: 0.08 M/S
TR MAX PG: 34 MMHG

## 2019-08-20 PROCEDURE — 99999 PR PBB SHADOW E&M-EST. PATIENT-LVL I: CPT | Mod: PBBFAC,,,

## 2019-08-20 PROCEDURE — 93351 STRESS TTE COMPLETE: CPT | Mod: S$GLB,,, | Performed by: INTERNAL MEDICINE

## 2019-08-20 PROCEDURE — 99999 PR PBB SHADOW E&M-EST. PATIENT-LVL I: ICD-10-PCS | Mod: PBBFAC,,,

## 2019-08-20 PROCEDURE — 93321 DOPPLER ECHO F-UP/LMTD STD: CPT | Mod: S$GLB,,, | Performed by: INTERNAL MEDICINE

## 2019-08-20 PROCEDURE — 93321 PR DOPPLER ECHO HEART,LIMITED,F/U: ICD-10-PCS | Mod: S$GLB,,, | Performed by: INTERNAL MEDICINE

## 2019-08-20 PROCEDURE — 93351 ECHOCARDIOGRAM STRESS TEST (CUPID ONLY): ICD-10-PCS | Mod: S$GLB,,, | Performed by: INTERNAL MEDICINE

## 2019-08-22 ENCOUNTER — TELEPHONE (OUTPATIENT)
Dept: FAMILY MEDICINE | Facility: CLINIC | Age: 49
End: 2019-08-22

## 2019-08-22 NOTE — TELEPHONE ENCOUNTER
----- Message from Renae Story sent at 8/22/2019  4:16 PM CDT -----  Contact: Gina Wang called to check status of a diabetes rx that's injectable that was suppose to be sent to pharmacy, please call back at [phone].

## 2019-08-22 NOTE — TELEPHONE ENCOUNTER
Spoke w/ Gina. Pt told her that Dr Torre was supposed to be sending an rx for some sort of injectable DM med. Please review last nost and advise if something was supposed to be rx'd. We will call pt to advise once this has been reviewed.

## 2019-08-26 NOTE — TELEPHONE ENCOUNTER
Called patient in regards to letting him know the provider's course of actions he should be taking for his rx of metformin with jardiance. No answer. Left voicemail to return phone call to clinic. Clinic number provided.

## 2019-08-26 NOTE — TELEPHONE ENCOUNTER
We had talked about different medications we could add to his metformin to better control his diabetes; 1 of which was Victoza which was an injectable; but we had decided on Jardiance to be added to his metformin to both help him lose weight as well as get better control on his diabetes please ensure that he has obtained the Jardiance at 10 mg daily which was sent in for him on 08/13/2019.  He is not to be taking the injectable Victoza for now as we decided to add Jardiance .

## 2019-08-27 NOTE — TELEPHONE ENCOUNTER
Spoke w/ pt. Advised as recommended about taking the Jardience and Metformin. Pt agreed and confirmed that pharmacy went over this with him. Pt missed nurse BP appt today and asked to resched to Thurs. At 1pm. Done.

## 2019-08-27 NOTE — TELEPHONE ENCOUNTER
Called [facility]. Sat on hold x 7min first time. Called back, spoke w/ pharmacist and confirmed this info. She states pt has already picked up the Jardience and will also make sure pt is understanding of this rx.

## 2019-09-30 ENCOUNTER — PATIENT OUTREACH (OUTPATIENT)
Dept: ADMINISTRATIVE | Facility: HOSPITAL | Age: 49
End: 2019-09-30

## 2019-10-14 ENCOUNTER — OFFICE VISIT (OUTPATIENT)
Dept: FAMILY MEDICINE | Facility: CLINIC | Age: 49
End: 2019-10-14
Payer: COMMERCIAL

## 2019-10-14 VITALS
BODY MASS INDEX: 33.2 KG/M2 | HEART RATE: 84 BPM | SYSTOLIC BLOOD PRESSURE: 148 MMHG | TEMPERATURE: 98 F | DIASTOLIC BLOOD PRESSURE: 100 MMHG | OXYGEN SATURATION: 98 % | HEIGHT: 71 IN | WEIGHT: 237.13 LBS

## 2019-10-14 DIAGNOSIS — I10 UNCONTROLLED HYPERTENSION: ICD-10-CM

## 2019-10-14 DIAGNOSIS — Z91.199 NONCOMPLIANCE: ICD-10-CM

## 2019-10-14 DIAGNOSIS — E03.9 ACQUIRED HYPOTHYROIDISM: ICD-10-CM

## 2019-10-14 DIAGNOSIS — R73.9 HYPERGLYCEMIA: ICD-10-CM

## 2019-10-14 DIAGNOSIS — F41.1 GENERALIZED ANXIETY DISORDER: ICD-10-CM

## 2019-10-14 DIAGNOSIS — E11.9 TYPE 2 DIABETES MELLITUS WITHOUT COMPLICATION, WITHOUT LONG-TERM CURRENT USE OF INSULIN: Primary | ICD-10-CM

## 2019-10-14 DIAGNOSIS — E78.2 MIXED HYPERLIPIDEMIA: ICD-10-CM

## 2019-10-14 DIAGNOSIS — E66.9 OBESITY (BMI 30.0-34.9): ICD-10-CM

## 2019-10-14 PROCEDURE — 99214 PR OFFICE/OUTPT VISIT, EST, LEVL IV, 30-39 MIN: ICD-10-PCS | Mod: S$GLB,,, | Performed by: INTERNAL MEDICINE

## 2019-10-14 PROCEDURE — 3008F PR BODY MASS INDEX (BMI) DOCUMENTED: ICD-10-PCS | Mod: CPTII,S$GLB,, | Performed by: INTERNAL MEDICINE

## 2019-10-14 PROCEDURE — 3080F DIAST BP >= 90 MM HG: CPT | Mod: CPTII,S$GLB,, | Performed by: INTERNAL MEDICINE

## 2019-10-14 PROCEDURE — 3077F PR MOST RECENT SYSTOLIC BLOOD PRESSURE >= 140 MM HG: ICD-10-PCS | Mod: CPTII,S$GLB,, | Performed by: INTERNAL MEDICINE

## 2019-10-14 PROCEDURE — 3077F SYST BP >= 140 MM HG: CPT | Mod: CPTII,S$GLB,, | Performed by: INTERNAL MEDICINE

## 2019-10-14 PROCEDURE — 3080F PR MOST RECENT DIASTOLIC BLOOD PRESSURE >= 90 MM HG: ICD-10-PCS | Mod: CPTII,S$GLB,, | Performed by: INTERNAL MEDICINE

## 2019-10-14 PROCEDURE — 99999 PR PBB SHADOW E&M-EST. PATIENT-LVL III: ICD-10-PCS | Mod: PBBFAC,,, | Performed by: INTERNAL MEDICINE

## 2019-10-14 PROCEDURE — 3008F BODY MASS INDEX DOCD: CPT | Mod: CPTII,S$GLB,, | Performed by: INTERNAL MEDICINE

## 2019-10-14 PROCEDURE — 99214 OFFICE O/P EST MOD 30 MIN: CPT | Mod: S$GLB,,, | Performed by: INTERNAL MEDICINE

## 2019-10-14 PROCEDURE — 99999 PR PBB SHADOW E&M-EST. PATIENT-LVL III: CPT | Mod: PBBFAC,,, | Performed by: INTERNAL MEDICINE

## 2019-10-14 RX ORDER — METOPROLOL SUCCINATE 25 MG/1
25 TABLET, EXTENDED RELEASE ORAL EVERY MORNING
Qty: 30 TABLET | Refills: 2 | Status: SHIPPED | OUTPATIENT
Start: 2019-10-14 | End: 2020-01-13

## 2019-10-14 RX ORDER — METFORMIN HYDROCHLORIDE 500 MG/1
TABLET, FILM COATED, EXTENDED RELEASE ORAL
Qty: 120 TABLET | Refills: 2 | Status: SHIPPED | OUTPATIENT
Start: 2019-10-14 | End: 2020-01-09

## 2019-10-14 NOTE — PROGRESS NOTES
Subjective:       Patient ID: Rashid Del Valle is a 49 y.o. male.    Chief Complaint: Follow-up    HPI  Here today for reassessment and to go over labs. Forgot to get his labs.   HTN uncontrolled: today 148/100; BP's at prior office visits: 122/94; 120/84; 130/80. Add metoprolol succinate 25 mg each morning.  Patient also reportedly is not watching his salt intake which claims he will start doing.  DM2: has not been on low carb diet; stressed importance and benefit; BS avr 220's but not fasting; has eaten 2-3 hrs prior. 246 by POCT glucose in office; last calories last night.   Patient is supposed to be on chart hands 10 mg per day and metformin 500 mg XR 2 at night; will change to 1 with breakfast, 1 with lunch, and 2 with dinner. 07/17/2019  hemoglobin A1c 8.3 up from 7.0 at 6 months ago.  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  Will also increase his Jardiance from 10 to  25 mg daily.  Last night blood sugar was 246.  DESI: multiple meds as per psych; seems to be controlled.   Mix HLP: tries to stay on low fat diet but not sure.   Hypothyroidism: takes levothyroxine appropriately; on 75 mcg a day.   Obesity:  Stressed the importance of regular exercise 5 times a week minimum 25-30 minutes along with caloric restriction help his weight come down.  Patient has lost 5 lb since 08/13/2019 visit however.    Review of Systems   Constitutional: Negative for appetite change, fever and unexpected weight change.   HENT: Negative for congestion, postnasal drip and rhinorrhea.    Respiratory: Negative for cough, chest tightness, shortness of breath and wheezing.    Cardiovascular: Negative for chest pain, palpitations and leg swelling.   Gastrointestinal: Negative for abdominal pain, blood in stool, constipation, diarrhea, nausea and  "vomiting.   Endocrine: Positive for polydipsia and polyuria. Negative for polyphagia.        On Jardiance.   Genitourinary: Negative for dysuria and hematuria.   Musculoskeletal: Negative for arthralgias and myalgias.   Skin: Negative for rash.   Allergic/Immunologic: Negative for environmental allergies and food allergies.   Neurological: Negative for syncope and headaches.   Psychiatric/Behavioral: Negative for dysphoric mood. The patient is not nervous/anxious.        Objective:      Vitals:    10/14/19 1457   BP: (!) 148/100   BP Location: Left arm   Patient Position: Sitting   BP Method: Large (Manual)   Pulse: 84   Temp: 98.3 °F (36.8 °C)   TempSrc: Oral   SpO2: 98%   Weight: 107.6 kg (237 lb 1.7 oz)   Height: 5' 11" (1.803 m)     Body mass index is 33.07 kg/m².    Physical Exam   Constitutional: He is oriented to person, place, and time. He appears well-developed and well-nourished.   HENT:   Head: Normocephalic and atraumatic.   Eyes: EOM are normal.   Neck: Normal range of motion. Neck supple. No thyromegaly present.   No carotid bruits heard    Cardiovascular: Normal rate, regular rhythm and normal heart sounds. Exam reveals no gallop.   No murmur heard.  Pulmonary/Chest: Effort normal and breath sounds normal. No respiratory distress. He has no wheezes. He has no rales.   Abdominal: Soft. Bowel sounds are normal. He exhibits no distension. There is no tenderness. There is no rebound and no guarding.   Musculoskeletal: Normal range of motion. He exhibits no edema.   Lymphadenopathy:     He has no cervical adenopathy.   Neurological: He is alert and oriented to person, place, and time.   Moves all 4 extremities fine.   Skin: No rash noted.   Psychiatric: He has a normal mood and affect. His behavior is normal. Thought content normal.   Vitals reviewed.      Assessment:       1. Type 2 diabetes mellitus without complication, without long-term current use of insulin    2. Hyperglycemia    3. Uncontrolled " hypertension    4. Mixed hyperlipidemia    5. Noncompliance    6. Generalized anxiety disorder    7. Acquired hypothyroidism    8. Obesity (BMI 30.0-34.9)        Plan:       Type 2 diabetes mellitus without complication, without long-term current use of insulin; needs to schedule eye exam; needs copy of evaluation for chart.   Maintain a l Bring in glucometer for nursing visit in 2 weeks. ow carb diet; monitor CBG's at home; keep a log for review. Take metformin 500 mg 24hr: one w breakfast, one w lunch, two w dinner. CBG's as fasting before breakfast and dinner.  Will also increase Jardiance from 10 mg per day to 25 mg per day  -     empagliflozin (JARDIANCE) 25 mg Tab; Take 25 mg by mouth every morning.  Dispense: 30 tablet; Refill: 2  -     metFORMIN (GLUMETZA) 500 MG (MOD) 24 hr tablet; One w breakfast, one w lunch, two w dinner. Generic.  Dispense: 120 tablet; Refill: 2    Hyperglycemia; exercise w weight reduction. Adhere to low carb diet as well.  Take medications as directed  -     POCT Glucose, Hand-Held Device  -     empagliflozin (JARDIANCE) 25 mg Tab; Take 25 mg by mouth every morning.  Dispense: 30 tablet; Refill: 2  -     metFORMIN (GLUMETZA) 500 MG (MOD) 24 hr tablet; One w breakfast, one w lunch, two w dinner. Generic.  Dispense: 120 tablet; Refill: 2    Uncontrolled hypertension.Maintain < 2 Gm Na a day diet, and monitor BP at home; keep a log.  Begin metoprolol succinate 25 mg p.o. Daily; may help his anxiety as well; limitation of caffeine intake  -     metoprolol succinate (TOPROL-XL) 25 MG 24 hr tablet; Take 1 tablet (25 mg total) by mouth every morning.  Dispense: 30 tablet; Refill: 2    Mixed hyperlipidemia. Maintain low fat high fiber diet, exercise regularly. Weight reduction where indicated.  Lipid panel update needed for follow-up    Noncompliance:  Stressed the importance of adhering to a low carb, low-salt, low-fat, high-fiber diet for management of his uncontrolled diabetes,  uncontrolled hypertension and mixed hyperlipidemia, as well as weight reduction..  Also the importance of taking his medications as prescribed as well; also with need for exercise program still to help weight reduction and above diagnosis.    Generalized anxiety disorder; limit caffeine intake with stress reduction and regular exercise as tolerated. Meds a sper his psychiatrist.     Acquired hypothyroidism. Same thyroid dosing; TFT for f/u.     Obesity (BMI 30.0-34.9). Caloric restriction w regular exercise and weight reduction.

## 2019-10-14 NOTE — PATIENT INSTRUCTIONS
Type 2 diabetes mellitus without complication, without long-term current use of insulin; needs to schedule eye exam; needs copy of evaluation for chart.   Maintain a l Bring in glucometer for nursing visit in 2 weeks. ow carb diet; monitor CBG's at home; keep a log for review. Take metformin 500 mg 24hr: one w breakfast, one w lunch, two w dinner. CBG's as fasting before breakfast and dinner.   -     empagliflozin (JARDIANCE) 25 mg Tab; Take 25 mg by mouth every morning.  Dispense: 30 tablet; Refill: 2  -     metFORMIN (GLUMETZA) 500 MG (MOD) 24 hr tablet; One w breakfast, one w lunch, two w dinner. Generic.  Dispense: 120 tablet; Refill: 2    Hyperglycemia; exercise w weight reduction. Adhere to low carb diet as well.   -     POCT Glucose, Hand-Held Device  -     empagliflozin (JARDIANCE) 25 mg Tab; Take 25 mg by mouth every morning.  Dispense: 30 tablet; Refill: 2  -     metFORMIN (GLUMETZA) 500 MG (MOD) 24 hr tablet; One w breakfast, one w lunch, two w dinner. Generic.  Dispense: 120 tablet; Refill: 2    Uncontrolled hypertension.Maintain < 2 Gm Na a day diet, and monitor BP at home; keep a log.  -     metoprolol succinate (TOPROL-XL) 25 MG 24 hr tablet; Take 1 tablet (25 mg total) by mouth every morning.  Dispense: 30 tablet; Refill: 2    Mixed hyperlipidemia.Maintain low fat high fiber diet, exercise regularly. Weight reduction where indicated.    Generalized anxiety disorder; Limit caffeine intake with stress reduction and regular exercise as tolerated. Meds a sper his psychiatrist.     Acquired hypothyroidism. Same thyroid dosing; TFT for f/u.     Obesity (BMI 30.0-34.9). Caloric restriction w regular exercise and weight reduction.

## 2019-10-16 ENCOUNTER — TELEPHONE (OUTPATIENT)
Dept: FAMILY MEDICINE | Facility: CLINIC | Age: 49
End: 2019-10-16

## 2019-10-16 NOTE — TELEPHONE ENCOUNTER
PA request rec'd from [employer]([id number]) for Metformin ER 500mg Modified Tabs, #120 tab, 2R    Phone [phone]  Pt ID [id number]    PA initiated via [email or web address]  Key    Awaiting response.    Rec'd fax from [employer]. Rx has been denied by pt's insurance carrier.

## 2019-10-18 ENCOUNTER — TELEPHONE (OUTPATIENT)
Dept: FAMILY MEDICINE | Facility: CLINIC | Age: 49
End: 2019-10-18

## 2019-10-18 NOTE — TELEPHONE ENCOUNTER
Discussed with pharmacy at [facility] insurance had declined metformin /Glumetza generic but in discussion with pharmacist they will accept Glucophage 500 XR generic which was ordered in its place 500 mg 1 with breakfast 1 with lunch 2 with dinner; #120 with 2 refills;  Please notify patient that we sent in to [facility]'s a generic substitute that his insurance company covers as Glucophage 500 XR 1 with breakfast, 1 with lunch, and 2 with dinner.  For him to

## 2019-10-18 NOTE — TELEPHONE ENCOUNTER
This is already been addressed by phone with [facility] Glucophage  mg was acceptable to the insurance company and was given by an order orally over the phone to the pharmacist

## 2019-10-25 DIAGNOSIS — Z12.11 COLON CANCER SCREENING: ICD-10-CM

## 2019-11-17 DIAGNOSIS — I10 ESSENTIAL HYPERTENSION: ICD-10-CM

## 2019-11-17 DIAGNOSIS — E11.9 TYPE 2 DIABETES MELLITUS WITHOUT COMPLICATION, WITHOUT LONG-TERM CURRENT USE OF INSULIN: ICD-10-CM

## 2019-11-17 DIAGNOSIS — E78.2 MIXED HYPERLIPIDEMIA: ICD-10-CM

## 2019-11-19 RX ORDER — ROSUVASTATIN CALCIUM 20 MG/1
TABLET, COATED ORAL
Qty: 90 TABLET | Refills: 3 | Status: SHIPPED | OUTPATIENT
Start: 2019-11-19 | End: 2020-11-02

## 2019-12-19 ENCOUNTER — PATIENT OUTREACH (OUTPATIENT)
Dept: ADMINISTRATIVE | Facility: HOSPITAL | Age: 49
End: 2019-12-19

## 2019-12-19 ENCOUNTER — TELEPHONE (OUTPATIENT)
Dept: ADMINISTRATIVE | Facility: HOSPITAL | Age: 49
End: 2019-12-19

## 2019-12-19 NOTE — PROGRESS NOTES
Our records show you are due for colon cancer screening.  If you have already had your screening, or you have made an appointment for your screening, congratulations!  Youre on the road to good health. If you havent signed up for a colorectal screening please accept this invitation to be screened.      According to the American Cancer Society, colon cancer is the third most common cancer for people in the [place].  A simple screening test Fit Kit - done in your own home - can help find colon cancer at an early stage when it can be treated, even before any signs or symptoms develop.     Testing for blood in your stool (feces or bowel movement) is the first step. If you have an upcoming visit with your Primary Care Physician you can  a Fit Kit during your visit, or you can  a Fit Kit at your Primary Care Clinic prior to your visit.    If your test results are negative, you wont need testing again for another year.  If results show you need more testing, we will call you with next steps.    Regular colorectal cancer screening is one of the most powerful weapons for preventing coloncancer.     I wish you continued good health!    Sincerely,      Kailyn Costello, Care Coordinator  [facility]  Phone: [phone]  Fax: [phone]

## 2019-12-19 NOTE — LETTER
December 19, 2019    Rashid Del Valle  [address] [place] [address]             [facility]  [address]  [place] [address]  Phone: [phone] Our records show you are due for colon cancer screening.  If you have already had your screening, or you have made an appointment for your screening, congratulations!  Youre on the road to good health. If you havent signed up for a colorectal screening please accept this invitation to be screened.      According to the American Cancer Society, colon cancer is the third most common cancer for people in the [place].  A simple screening test Fit Kit - done in your own home - can help find colon cancer at an early stage when it can be treated, even before any signs or symptoms develop.     Testing for blood in your stool (feces or bowel movement) is the first step. If you have an upcoming visit with your Primary Care Physician you can  a Fit Kit during your visit, or you can  a Fit Kit at your Primary Care Clinic prior to your visit.    If your test results are negative, you wont need testing again for another year.  If results show you need more testing, we will call you with next steps.    Regular colorectal cancer screening is one of the most powerful weapons for preventing coloncancer.     I wish you continued good health!    Sincerely,      Kaliyn Costello, Care Coordinator  [facility]  Phone: [phone]  Fax: [phone]

## 2020-01-09 DIAGNOSIS — E11.9 TYPE 2 DIABETES MELLITUS WITHOUT COMPLICATION, WITHOUT LONG-TERM CURRENT USE OF INSULIN: ICD-10-CM

## 2020-01-09 RX ORDER — METFORMIN HYDROCHLORIDE 500 MG/1
TABLET, EXTENDED RELEASE ORAL
Qty: 180 TABLET | Refills: 3 | Status: SHIPPED | OUTPATIENT
Start: 2020-01-09 | End: 2020-04-22

## 2020-01-13 DIAGNOSIS — R73.9 HYPERGLYCEMIA: ICD-10-CM

## 2020-01-13 DIAGNOSIS — I10 UNCONTROLLED HYPERTENSION: ICD-10-CM

## 2020-01-13 DIAGNOSIS — E11.9 TYPE 2 DIABETES MELLITUS WITHOUT COMPLICATION, WITHOUT LONG-TERM CURRENT USE OF INSULIN: ICD-10-CM

## 2020-01-13 RX ORDER — METOPROLOL SUCCINATE 25 MG/1
TABLET, EXTENDED RELEASE ORAL
Qty: 30 TABLET | Refills: 2 | Status: SHIPPED | OUTPATIENT
Start: 2020-01-13 | End: 2020-05-15 | Stop reason: SDUPTHER

## 2020-01-13 RX ORDER — EMPAGLIFLOZIN 25 MG/1
TABLET, FILM COATED ORAL
Qty: 30 TABLET | Refills: 2 | Status: SHIPPED | OUTPATIENT
Start: 2020-01-13 | End: 2020-04-22

## 2020-01-29 DIAGNOSIS — E03.9 ACQUIRED HYPOTHYROIDISM: ICD-10-CM

## 2020-01-30 RX ORDER — LEVOTHYROXINE SODIUM 125 UG/1
TABLET ORAL
Qty: 45 TABLET | Refills: 1 | OUTPATIENT
Start: 2020-01-30

## 2020-01-30 NOTE — TELEPHONE ENCOUNTER
Please call patient and have him schedule follow-up appointment as he is past due since around 11/11/2019.  Please have him also obtain his labs after an overnight fast before hand.  Also need clarification on his levothyroxine dosing:  His chart shows he is on 75 mcg daily but the request is for a half of 125 mcg tablet.  Please have him clarify the present dose he is taking

## 2020-02-04 NOTE — TELEPHONE ENCOUNTER
Called a patient in regards to scheduling his labs, follow up appt, and going over his meds. No answer. LVM to return phone call to clinic

## 2020-02-16 DIAGNOSIS — E66.9 OBESITY (BMI 30.0-34.9): ICD-10-CM

## 2020-02-16 DIAGNOSIS — E03.9 ACQUIRED HYPOTHYROIDISM: ICD-10-CM

## 2020-02-16 DIAGNOSIS — F41.1 GENERALIZED ANXIETY DISORDER: ICD-10-CM

## 2020-02-16 DIAGNOSIS — E78.2 MIXED HYPERLIPIDEMIA: ICD-10-CM

## 2020-02-16 RX ORDER — LEVOTHYROXINE SODIUM 75 UG/1
TABLET ORAL
Qty: 90 TABLET | Refills: 0 | Status: SHIPPED | OUTPATIENT
Start: 2020-02-16 | End: 2020-05-14

## 2020-02-17 NOTE — TELEPHONE ENCOUNTER
Called pt in regards to rx refill. No answer. LVM to return phone call to clinic to schedule follow up.

## 2020-02-19 NOTE — TELEPHONE ENCOUNTER
Spoke to patient informed him of providers instructions. Gave him an appt for 3/20/2020 and also scheduled labs.

## 2020-02-21 DIAGNOSIS — E11.9 TYPE 2 DIABETES MELLITUS WITHOUT COMPLICATION, UNSPECIFIED WHETHER LONG TERM INSULIN USE: ICD-10-CM

## 2020-03-20 ENCOUNTER — TELEPHONE (OUTPATIENT)
Dept: FAMILY MEDICINE | Facility: CLINIC | Age: 50
End: 2020-03-20

## 2020-03-20 NOTE — TELEPHONE ENCOUNTER
Attempted to contact pt several times in regards to scheduled ov, no answer, lvm to return call to clinic.

## 2020-03-24 ENCOUNTER — TELEPHONE (OUTPATIENT)
Dept: FAMILY MEDICINE | Facility: CLINIC | Age: 50
End: 2020-03-24

## 2020-04-08 ENCOUNTER — PATIENT OUTREACH (OUTPATIENT)
Dept: ADMINISTRATIVE | Facility: HOSPITAL | Age: 50
End: 2020-04-08

## 2020-04-08 NOTE — PROGRESS NOTES
Hemoglobin A1C non-compliant report.    Chart review completed 04/08/2020.  Care Everywhere updates requested and reviewed.  Immunizations reconciled. Media reviewed.     Last A1C 7/7/2019 (8.3) Needs labs and follow up appointment.    [facility]-no labs/tests found for gap closure.    Health Maintenance Due   Topic Date Due    Foot Exam  10/18/1979    Eye Exam  10/18/1979    HIV Screening  10/18/1984    TETANUS VACCINE  10/18/1987    Pneumococcal Vaccine (Medium Risk) (1 of 1 - PPSV23) 10/18/1988    Influenza Vaccine (1) 09/01/2019    Hemoglobin A1c  10/17/2019    Shingles Vaccine (1 of 2) 10/18/2019    Colonoscopy  10/18/2019

## 2020-04-20 ENCOUNTER — NURSE TRIAGE (OUTPATIENT)
Dept: ADMINISTRATIVE | Facility: CLINIC | Age: 50
End: 2020-04-20

## 2020-04-20 NOTE — TELEPHONE ENCOUNTER
His blood glucose has been running, on average, around 200, and when he woke up today at 1:30 his blood glucose was 387, he is fatigued, but stayed up later than usual.  He has a dry mouth and frequent urination, but this is not unusual for him. He is also having bilateral flank pain, but did a lot of heavy lifting over the weekend.  He states his urine has a very strong/foul odor to it, but states this has been going on for approximately 8 months, not new.  He also describes what sounds like an inguinal hernia that is sometimes painful, but manually reducible, this is also not a new symptoms.  Gave home care advice and will contact Dr. Torre for a virtual visit today.    Reason for Disposition   Symptoms of high blood sugar (e.g., frequent urination, weak, weight loss) and not able to test blood glucose    Additional Information   Negative: Unconscious or difficult to awaken   Negative: Acting confused (e.g., disoriented, slurred speech)   Negative: Very weak (can't stand)   Negative: Sounds like a life-threatening emergency to the triager   Negative: Vomiting and signs of dehydration (e.g., very dry mouth, lightheaded, etc.)   Negative: Blood glucose > 240 mg/dl (13 mmol/l) and rapid breathing   Negative: Blood glucose > 500 mg/dl (27.5 mmol/l)   Negative: Blood glucose > 240 mg/dl (13 mmol/l) AND urine ketones moderate-large (or more than 1+)   Negative: Blood glucose > 240 mg/dl (13 mmol/l) and blood ketones > 1.5 mmol/l   Negative: Blood glucose > 240 mg/dl (13 mmol/l) AND vomiting AND unable to check for ketones (in blood or urine)   Negative: Vomiting lasting > 4 hours   Negative: Patient sounds very sick or weak to the triager   Negative: Fever > 100.5 F (38.1 C)   Negative: Caller has URGENT medication or insulin pump question and triager unable to answer question   Negative: Blood glucose > 400 mg/dl (22 mmol/l)   Negative: Blood glucose > 300 mg/dl (16.7 mmol/l) AND two or more times in  a row   Negative: Urine ketones moderate - large (or blood ketones > 1.5 mmol/l)   Negative: New-onset diabetes suspected (e.g., frequent urination, weak, weight loss)    Protocols used: DIABETES - HIGH BLOOD SUGAR-A-OH

## 2020-04-20 NOTE — TELEPHONE ENCOUNTER
Spoke with pt, he took two metformin and BS is down to 260. Offered audio visit with pt today, he declines. Scheduled for tomorrow.

## 2020-04-20 NOTE — TELEPHONE ENCOUNTER
Tried to reach patient by phone got a recorder left a message.  Would like to have him increase his metformin 500 extended release to 2 twice a day to better control his blood sugar and he needs to also watch his diet a lot closer.  Have also left a message that if the flank pain is bad enough he should go on into emergency room to have it evaluated; same thing with regards to the herniation as that had been reducible and he did not desire surgical evaluation at that time however if it is painful at present and not reducible he should had on and for that purpose to emergency room as well; he has a virtual visit with me tomorrow however if anything becomes an emergency he she had on into the emergency room for further evaluation

## 2020-04-21 ENCOUNTER — TELEPHONE (OUTPATIENT)
Dept: FAMILY MEDICINE | Facility: CLINIC | Age: 50
End: 2020-04-21

## 2020-04-21 DIAGNOSIS — R73.9 HYPERGLYCEMIA: ICD-10-CM

## 2020-04-21 DIAGNOSIS — E11.9 TYPE 2 DIABETES MELLITUS WITHOUT COMPLICATION, WITHOUT LONG-TERM CURRENT USE OF INSULIN: Primary | ICD-10-CM

## 2020-04-21 NOTE — TELEPHONE ENCOUNTER
Tried reaching patient for his audio visit today which was scheduled at 1:20 p.m..  Got a recorder on his cell phone and left a message; called his home number and kept ringing.  Please reschedule patient's audiovisual appointment sometime within the next few days or sometime next week.  Thank you

## 2020-04-22 RX ORDER — METFORMIN HYDROCHLORIDE 1000 MG/1
1000 TABLET ORAL 2 TIMES DAILY WITH MEALS
Qty: 180 TABLET | Refills: 1 | Status: SHIPPED | OUTPATIENT
Start: 2020-04-22 | End: 2022-01-18

## 2020-04-23 ENCOUNTER — TELEPHONE (OUTPATIENT)
Dept: FAMILY MEDICINE | Facility: CLINIC | Age: 50
End: 2020-04-23

## 2020-04-23 NOTE — TELEPHONE ENCOUNTER
Spoke to patient on phone and he has not been taking Jardiance due to expense.  On the metformin he has doubled up to 1000 mg a day; he has been 187-240; he doubled that up to metformin a 1000 mg twice a day and now he has been running around 150 on his blood sugars; have sent in new metformin prescription for him at 1000 mg short-acting with breakfast and dinner twice a day.  And will also add Januvia 100 mg a day to further lower his blood sugar. Have cancelled Jardiance due to cost; even w coupon too expensive. He has been advised to go ahead and obtain his labs standing since August of 2019.  Stressed the importance of this to him; he will also schedule a virtual visit sometime within the next 3-5 working days to further discuss his diabetes; when labs are scheduled please have them obtain 08/13/2019 labs and 02/19/2020; both after an overnight 8 hr fast.  Please also schedule him for virtual visit in 3-5 working days.  To go over

## 2020-04-23 NOTE — TELEPHONE ENCOUNTER
----- Message from Lissy Jesus sent at 4/23/2020  3:43 PM CDT -----  Contact: patient  Type: Needs Medical Advice  Who Called:  Patient  Best Call Back Number: [phone]  Additional Information: Patient states that he was told he needs apt. For blood work and blood sugar problems needs to be seen sooner that  5/1.  Please call to advise.  Thanks!

## 2020-05-01 ENCOUNTER — LAB VISIT (OUTPATIENT)
Dept: LAB | Facility: HOSPITAL | Age: 50
End: 2020-05-01
Attending: INTERNAL MEDICINE
Payer: COMMERCIAL

## 2020-05-01 DIAGNOSIS — E66.9 OBESITY (BMI 30.0-34.9): ICD-10-CM

## 2020-05-01 DIAGNOSIS — F41.1 GENERALIZED ANXIETY DISORDER: ICD-10-CM

## 2020-05-01 DIAGNOSIS — E11.9 TYPE 2 DIABETES MELLITUS WITHOUT COMPLICATION, WITHOUT LONG-TERM CURRENT USE OF INSULIN: ICD-10-CM

## 2020-05-01 DIAGNOSIS — R73.9 HYPERGLYCEMIA: ICD-10-CM

## 2020-05-01 DIAGNOSIS — E03.9 ACQUIRED HYPOTHYROIDISM: ICD-10-CM

## 2020-05-01 DIAGNOSIS — E78.2 MIXED HYPERLIPIDEMIA: ICD-10-CM

## 2020-05-01 LAB
ALBUMIN SERPL BCP-MCNC: 3.8 G/DL (ref 3.5–5.2)
ALP SERPL-CCNC: 83 U/L (ref 55–135)
ALT SERPL W/O P-5'-P-CCNC: 21 U/L (ref 10–44)
ANION GAP SERPL CALC-SCNC: 10 MMOL/L (ref 8–16)
AST SERPL-CCNC: 22 U/L (ref 10–40)
BILIRUB SERPL-MCNC: 0.4 MG/DL (ref 0.1–1)
BUN SERPL-MCNC: 17 MG/DL (ref 6–20)
CALCIUM SERPL-MCNC: 9.2 MG/DL (ref 8.7–10.5)
CHLORIDE SERPL-SCNC: 99 MMOL/L (ref 95–110)
CHOLEST SERPL-MCNC: 142 MG/DL (ref 120–199)
CHOLEST/HDLC SERPL: 5.1 {RATIO} (ref 2–5)
CO2 SERPL-SCNC: 29 MMOL/L (ref 23–29)
CREAT SERPL-MCNC: 1.1 MG/DL (ref 0.5–1.4)
EST. GFR  (AFRICAN AMERICAN): >60 ML/MIN/1.73 M^2
EST. GFR  (NON AFRICAN AMERICAN): >60 ML/MIN/1.73 M^2
ESTIMATED AVG GLUCOSE: 220 MG/DL (ref 68–131)
GLUCOSE SERPL-MCNC: 197 MG/DL (ref 70–110)
HBA1C MFR BLD HPLC: 9.3 % (ref 4–5.6)
HDLC SERPL-MCNC: 28 MG/DL (ref 40–75)
HDLC SERPL: 19.7 % (ref 20–50)
LDLC SERPL CALC-MCNC: 55.2 MG/DL (ref 63–159)
NONHDLC SERPL-MCNC: 114 MG/DL
POTASSIUM SERPL-SCNC: 4.8 MMOL/L (ref 3.5–5.1)
PROT SERPL-MCNC: 7.7 G/DL (ref 6–8.4)
SODIUM SERPL-SCNC: 138 MMOL/L (ref 136–145)
T4 FREE SERPL-MCNC: 1.42 NG/DL (ref 0.71–1.51)
TRIGL SERPL-MCNC: 294 MG/DL (ref 30–150)
TSH SERPL DL<=0.005 MIU/L-ACNC: 1.34 UIU/ML (ref 0.4–4)
TSH SERPL DL<=0.005 MIU/L-ACNC: 1.34 UIU/ML (ref 0.4–4)

## 2020-05-01 PROCEDURE — 84443 ASSAY THYROID STIM HORMONE: CPT

## 2020-05-01 PROCEDURE — 80053 COMPREHEN METABOLIC PANEL: CPT

## 2020-05-01 PROCEDURE — 36415 COLL VENOUS BLD VENIPUNCTURE: CPT | Mod: PN

## 2020-05-01 PROCEDURE — 83036 HEMOGLOBIN GLYCOSYLATED A1C: CPT

## 2020-05-01 PROCEDURE — 84481 FREE ASSAY (FT-3): CPT

## 2020-05-01 PROCEDURE — 80061 LIPID PANEL: CPT

## 2020-05-01 PROCEDURE — 82947 ASSAY GLUCOSE BLOOD QUANT: CPT | Mod: 91

## 2020-05-01 PROCEDURE — 84439 ASSAY OF FREE THYROXINE: CPT

## 2020-05-02 LAB
GLUCOSE SERPL-MCNC: 197 MG/DL (ref 70–110)
T3FREE SERPL-MCNC: 3.2 PG/ML (ref 2.3–4.2)

## 2020-05-03 ENCOUNTER — TELEPHONE (OUTPATIENT)
Dept: FAMILY MEDICINE | Facility: CLINIC | Age: 50
End: 2020-05-03

## 2020-05-03 NOTE — TELEPHONE ENCOUNTER
Have patient schedule a virtual visit within the next few days for discussion of his labs and diabetes management ; thank you

## 2020-05-06 ENCOUNTER — TELEPHONE (OUTPATIENT)
Dept: FAMILY MEDICINE | Facility: CLINIC | Age: 50
End: 2020-05-06

## 2020-05-07 NOTE — TELEPHONE ENCOUNTER
Patient returned phone call. Patient is for audio follow up on 5/8/2020. Patient does not use the patient portal and declined doing so at this time. I voiced understanding.

## 2020-05-08 ENCOUNTER — OFFICE VISIT (OUTPATIENT)
Dept: FAMILY MEDICINE | Facility: CLINIC | Age: 50
End: 2020-05-08
Payer: COMMERCIAL

## 2020-05-08 DIAGNOSIS — R81 GLYCOSURIA: ICD-10-CM

## 2020-05-08 DIAGNOSIS — R73.9 HYPERGLYCEMIA: ICD-10-CM

## 2020-05-08 DIAGNOSIS — E78.2 MIXED HYPERLIPIDEMIA: ICD-10-CM

## 2020-05-08 DIAGNOSIS — E11.9 TYPE 2 DIABETES MELLITUS WITHOUT COMPLICATION, WITHOUT LONG-TERM CURRENT USE OF INSULIN: Primary | ICD-10-CM

## 2020-05-08 DIAGNOSIS — E03.9 ACQUIRED HYPOTHYROIDISM: ICD-10-CM

## 2020-05-08 DIAGNOSIS — E66.9 OBESITY (BMI 30.0-34.9): ICD-10-CM

## 2020-05-08 DIAGNOSIS — I10 ESSENTIAL HYPERTENSION: ICD-10-CM

## 2020-05-08 PROCEDURE — 99214 OFFICE O/P EST MOD 30 MIN: CPT | Mod: 95,,, | Performed by: INTERNAL MEDICINE

## 2020-05-08 PROCEDURE — 99214 PR OFFICE/OUTPT VISIT, EST, LEVL IV, 30-39 MIN: ICD-10-PCS | Mod: 95,,, | Performed by: INTERNAL MEDICINE

## 2020-05-08 NOTE — Clinical Note
Please have patient schedule a virtual visit with me in 2 weeks.  Please also have him obtain labs after an overnight fast a few days prior.

## 2020-05-08 NOTE — PROGRESS NOTES
Established Patient - Audio Only Telehealth Visit     The patient location is:  Home  The chief complaint leading to consultation is:  Hypertension and diabetic control  Visit type: Virtual visit with audio only (telephone)  Total time spent with patient:  12:52 p.m. to 1:15 p.m..  Labs reviewed w patient at length and ordered for follow-up.  Medications reviewed and addressed as well.     The reason for the audio only service rather than synchronous audio and video virtual visit was related to technical difficulties or patient preference/necessity.     Each patient to whom I provide medical services by telemedicine is:  (1) informed of the relationship between the physician and patient and the respective role of any other health care provider with respect to management of the patient; and (2) notified that they may decline to receive medical services by telemedicine and may withdraw from such care at any time. Patient verbally consented to receive this service via voice-only telephone call.       HPI:  Uncontrolled diabetes type 2 with hyperglycemia:  Recently doubled patient's metformin to a 1000 mg twice a day to help his blood sugars and they have gone from 187-240 to the new readings of 150-200.  Patient was supposed to  100 mg of Januvia also per day but apparently that fell through the cracks.  Will resend to his pharmacist.  Stressed the importance of his picking up his medication.  Fasting blood sugars 197 and hemoglobin A1c has worsened from 8.3 to now 9.3.  Discussed insulin being instituted.  Patient does not want to be on any insulin.  Stressed importance of his picking up his Januvia and adhering to his diet and exercising regularly.  UA with glucose positive and protein positive will follow on follow-up.  Jardiance canceled earlier because it was too expensive and he could not afford it with even with a coupon.  Patient has lost 6 lb and is down to 231 lb now.  Has dropped his cokes down from  12 a day to 2 a day.  Needs to wean off [employer] altogether.    Hypertension:  Still needs to  a blood pressure biceps cuff for home blood pressure monitoring stressed the importance of this.  Will watch his salt intake and  a blood pressure biceps cuff for monitoring at home.  Hypothyroidism:  On levothyroxine 75 mcg p.o. daily; TSH therapeutic at 1.345; free T3 3.2 and free T4 1.42.  Mixed hyperlipidemia:  Stressed the importance of being on low-fat high-fiber diet and regular exercise also.  Tolerates rosuvastatin 20 mg a day fine..  Recent lipid profile cholesterol 142/triglyceride increased to 294/HDL dropped 28/LDL 55.  To work on low-fat high-fiber diet closer including restriction dairy products and include regular exercise in his normal routine.  Incorporate DASH diet as well  Obesity:  10/14/2019 weight was 237 lb 1.7 oz with BMI 33.07; stressed the importance of caloric restriction and regular exercise to help his weight come down    Assessment and plan:         Uncontrolled diabetes mellitus type 2 with hyperglycemia:  Watches carbohydrates better and include exercise in his daily routine.  Caloric restriction help his weight come down.  Continue metformin a 1000 mg twice a day.  Still needs to  Januvia from the pharmacist at 100 mg p.o. Daily.  Does not want to start insulin yet no needed with hemoglobin A1c now 9.3 up from 8.3.  Stressed the importance of his taking his medicines as directed by providers in keeping his follow-up.  Also declines diabetic education.       Hypertension:  Still needs to obtain biceps blood pressure cuff for home BP monitoring he understands the importance of this will obtain 1 and will start monitoring his blood pressure at home.      Hypothyroidism:  TSH was 1.345; continue levothyroxine 75 mcg per day     Mixed hyperlipidemia:  Adhere to low-fat high-fiber diet along with regular exercise and weight reduction.  Limit dairy products more with  triglyceride increased to 294.  Avoid any alcohol as well.  Continue rosuvastatin 20 mg p.o. Daily.  Incorporate DASH diet as well     Obesity:  10/14/2019 weight was 237 lb 1.7 oz with BMI 33.07; stressed the importance of caloric restriction and regular exercise to help his weight come down.       This service was not originating from a related E/M service provided within the previous 7 days nor will  to an E/M service or procedure within the next 24 hours or my soonest available appointment.  Prevailing standard of care was able to be met in this audio-only visit.

## 2020-05-09 ENCOUNTER — TELEPHONE (OUTPATIENT)
Dept: FAMILY MEDICINE | Facility: CLINIC | Age: 50
End: 2020-05-09

## 2020-05-09 DIAGNOSIS — E11.9 TYPE 2 DIABETES MELLITUS WITHOUT COMPLICATION, WITHOUT LONG-TERM CURRENT USE OF INSULIN: ICD-10-CM

## 2020-05-09 DIAGNOSIS — R73.9 HYPERGLYCEMIA: ICD-10-CM

## 2020-05-09 NOTE — TELEPHONE ENCOUNTER
Januvia resent to pharmacy as claim prior script not received when receit confirmed by pharmacy. Discussed w pt.

## 2020-05-10 NOTE — PATIENT INSTRUCTIONS
Assessment and plan:         Uncontrolled diabetes mellitus type 2 with hyperglycemia:  Watches carbohydrates better and include exercise in his daily routine.  Caloric restriction help his weight come down.  Continue metformin a 1000 mg twice a day.  Still needs to  Januvia from the pharmacist at 100 mg p.o. Daily.  Does not want to start insulin yet no needed with hemoglobin A1c now 9.3 up from 8.3.  Stressed the importance of his taking his medicines as directed by providers in keeping his follow-up.  Also declines diabetic education.       Hypertension:  Still needs to obtain biceps blood pressure cuff for home BP monitoring he understands the importance of this will obtain 1 and will start monitoring his blood pressure at home.      Hypothyroidism:  TSH was 1.345; continue levothyroxine 75 g per day     Mixed hyperlipidemia:  Adhere to low-fat high-fiber diet along with regular exercise and weight reduction.  Limit dairy products more with triglyceride increased to 294.  Avoid any alcohol as well.  Continue rosuvastatin 20 mg p.o. Daily. Incorporate DASH diet as well     Obesity:  10/14/2019 weight was 237 lb 1.7 oz with BMI 33.07; stressed the importance of caloric restriction and regular exercise to help his weight come down

## 2020-05-11 ENCOUNTER — TELEPHONE (OUTPATIENT)
Dept: FAMILY MEDICINE | Facility: CLINIC | Age: 50
End: 2020-05-11

## 2020-05-11 NOTE — TELEPHONE ENCOUNTER
----- Message from Matteo Torre MD sent at 5/9/2020 11:15 PM CDT -----  Please have patient schedule a virtual visit with me in 2 weeks.  Please also have him obtain labs after an overnight fast a few days prior.

## 2020-05-11 NOTE — TELEPHONE ENCOUNTER
Attempted to contact pt in regards to scheduling two week f/u and lab work, lvm to return call to clinic.

## 2020-05-14 DIAGNOSIS — I10 UNCONTROLLED HYPERTENSION: ICD-10-CM

## 2020-05-14 DIAGNOSIS — E03.9 ACQUIRED HYPOTHYROIDISM: ICD-10-CM

## 2020-05-14 DIAGNOSIS — F41.1 GENERALIZED ANXIETY DISORDER: ICD-10-CM

## 2020-05-14 DIAGNOSIS — E78.2 MIXED HYPERLIPIDEMIA: ICD-10-CM

## 2020-05-14 DIAGNOSIS — E66.9 OBESITY (BMI 30.0-34.9): ICD-10-CM

## 2020-05-14 RX ORDER — LEVOTHYROXINE SODIUM 75 UG/1
TABLET ORAL
Qty: 90 TABLET | Refills: 1 | Status: SHIPPED | OUTPATIENT
Start: 2020-05-14 | End: 2021-03-16

## 2020-05-15 RX ORDER — METOPROLOL SUCCINATE 25 MG/1
TABLET, EXTENDED RELEASE ORAL
Qty: 30 TABLET | Refills: 2 | Status: SHIPPED | OUTPATIENT
Start: 2020-05-15 | End: 2020-09-11 | Stop reason: SDUPTHER

## 2020-08-14 DIAGNOSIS — E11.9 TYPE 2 DIABETES MELLITUS WITHOUT COMPLICATION: ICD-10-CM

## 2020-09-11 DIAGNOSIS — I10 UNCONTROLLED HYPERTENSION: ICD-10-CM

## 2020-09-11 RX ORDER — METOPROLOL SUCCINATE 25 MG/1
25 TABLET, EXTENDED RELEASE ORAL EVERY MORNING
Qty: 30 TABLET | Refills: 2 | Status: SHIPPED | OUTPATIENT
Start: 2020-09-11 | End: 2020-12-30

## 2020-09-11 NOTE — TELEPHONE ENCOUNTER
Pt Of Matteo Torre MD    Last seen on: 5/8/2020  Next appt: none     Pharmacy:   [id number] [address] [facility] [address]  [facility] [address]  Phone: [phone] Fax: [phone]    Please review! Thank you!

## 2020-09-15 ENCOUNTER — PATIENT OUTREACH (OUTPATIENT)
Dept: ADMINISTRATIVE | Facility: HOSPITAL | Age: 50
End: 2020-09-15

## 2020-09-21 ENCOUNTER — PATIENT OUTREACH (OUTPATIENT)
Dept: ADMINISTRATIVE | Facility: HOSPITAL | Age: 50
End: 2020-09-21

## 2020-09-21 NOTE — PROGRESS NOTES
QBPC >140/90 bp report.  Need recent home bp reading to document or schedule BP follow up. Left VM on cell.  Home number wasn't working.

## 2020-09-25 DIAGNOSIS — E11.9 TYPE 2 DIABETES MELLITUS WITHOUT COMPLICATION: ICD-10-CM

## 2020-10-13 ENCOUNTER — PATIENT OUTREACH (OUTPATIENT)
Dept: ADMINISTRATIVE | Facility: HOSPITAL | Age: 50
End: 2020-10-13

## 2020-10-13 NOTE — PROGRESS NOTES
QBPC Blood pressure non-compliant report  Last OV was virtual without a BP reading.  Following up on what patients blood pressure readings are at home.  What was the most recent home blood pressure reading to document a remote BP in chart?    Left message to return call.    no

## 2020-10-16 ENCOUNTER — OFFICE VISIT (OUTPATIENT)
Dept: URGENT CARE | Facility: CLINIC | Age: 50
End: 2020-10-16
Payer: COMMERCIAL

## 2020-10-16 VITALS
OXYGEN SATURATION: 98 % | HEART RATE: 80 BPM | RESPIRATION RATE: 12 BRPM | BODY MASS INDEX: 31.92 KG/M2 | TEMPERATURE: 98 F | DIASTOLIC BLOOD PRESSURE: 94 MMHG | WEIGHT: 228 LBS | SYSTOLIC BLOOD PRESSURE: 129 MMHG | HEIGHT: 71 IN

## 2020-10-16 DIAGNOSIS — R07.89 FEELING OF CHEST TIGHTNESS: Primary | ICD-10-CM

## 2020-10-16 LAB
CTP QC/QA: YES
SARS-COV-2 RDRP RESP QL NAA+PROBE: NEGATIVE

## 2020-10-16 PROCEDURE — U0002: ICD-10-PCS | Mod: QW,S$GLB,, | Performed by: PHYSICIAN ASSISTANT

## 2020-10-16 PROCEDURE — 99214 PR OFFICE/OUTPT VISIT, EST, LEVL IV, 30-39 MIN: ICD-10-PCS | Mod: S$GLB,,, | Performed by: PHYSICIAN ASSISTANT

## 2020-10-16 PROCEDURE — U0002 COVID-19 LAB TEST NON-CDC: HCPCS | Mod: QW,S$GLB,, | Performed by: PHYSICIAN ASSISTANT

## 2020-10-16 PROCEDURE — 99214 OFFICE O/P EST MOD 30 MIN: CPT | Mod: S$GLB,,, | Performed by: PHYSICIAN ASSISTANT

## 2020-10-16 RX ORDER — ALBUTEROL SULFATE 90 UG/1
2 AEROSOL, METERED RESPIRATORY (INHALATION) EVERY 6 HOURS PRN
Qty: 18 G | Refills: 0 | Status: SHIPPED | OUTPATIENT
Start: 2020-10-16 | End: 2022-08-18

## 2020-10-17 NOTE — PROGRESS NOTES
"Subjective:       Patient ID: Rashid Del Valle is a 49 y.o. male.    Vitals:  height is 5' 11" (1.803 m) and weight is 103.4 kg (228 lb). His temperature is 97.5 °F (36.4 °C). His blood pressure is 129/94 (abnormal) and his pulse is 80. His respiration is 12 and oxygen saturation is 98%.     Chief Complaint: Chest Pain    Rashid started with chest tightness today around 8 am this morning. He also has complaints of a headache. He also has complaints of shortness of breath. Denies any radiation into arm. No diaphoresis. Across entire chest. No FH of MI before 65. No hx asthma.     Chest Pain   This is a new problem. The current episode started today. The onset quality is sudden. The problem occurs constantly. The problem has been unchanged. The pain is at a severity of 4/10. The pain is mild. The quality of the pain is described as tightness and pressure. The pain does not radiate. Associated symptoms include shortness of breath. Pertinent negatives include no cough, diaphoresis, fever, hemoptysis, nausea, sputum production or vomiting. The pain is aggravated by nothing. He has tried nothing for the symptoms.   Pertinent negatives for past medical history include no COPD.       Constitution: Negative for chills, sweating, fatigue and fever.   HENT: Negative for ear pain, congestion, sinus pain, sinus pressure, sore throat and voice change.    Neck: Negative for painful lymph nodes.   Cardiovascular: Positive for chest pain.   Eyes: Negative for eye redness.   Respiratory: Positive for chest tightness and shortness of breath. Negative for cough, sputum production, bloody sputum, COPD, stridor, wheezing and asthma.    Gastrointestinal: Negative for nausea and vomiting.   Musculoskeletal: Negative for muscle ache.   Skin: Negative for rash and erythema.   Allergic/Immunologic: Negative for seasonal allergies and asthma.   Hematologic/Lymphatic: Negative for swollen lymph nodes.       Objective:      Physical Exam "   Constitutional: He is oriented to person, place, and time. He appears well-developed. He is cooperative.  Non-toxic appearance. He does not appear ill. No distress.   HENT:   Head: Normocephalic and atraumatic.   Ears:   Right Ear: Hearing and external ear normal.   Left Ear: Hearing and external ear normal.   Nose: Nose normal. No mucosal edema, rhinorrhea or nasal deformity. No epistaxis. Right sinus exhibits no maxillary sinus tenderness and no frontal sinus tenderness. Left sinus exhibits no maxillary sinus tenderness and no frontal sinus tenderness.   Mouth/Throat: Uvula is midline, oropharynx is clear and moist and mucous membranes are normal. No trismus in the jaw. Normal dentition. No uvula swelling. No oropharyngeal exudate, posterior oropharyngeal edema or posterior oropharyngeal erythema.   Eyes: Conjunctivae and lids are normal. No scleral icterus.   Neck: Trachea normal, full passive range of motion without pain and phonation normal. Neck supple. No neck rigidity. No edema and no erythema present.   Cardiovascular: Normal rate, regular rhythm, normal heart sounds and normal pulses.   Pulmonary/Chest: Effort normal and breath sounds normal. No respiratory distress. He has no decreased breath sounds. He has no wheezes. He has no rhonchi. He has no rales.   Abdominal: Normal appearance.   Musculoskeletal: Normal range of motion.         General: No deformity.   Neurological: He is alert and oriented to person, place, and time. He exhibits normal muscle tone. Coordination normal.   Skin: Skin is warm, dry, intact, not diaphoretic, not pale and no rash. erythemaPsychiatric: His speech is normal and behavior is normal. Mood, judgment and thought content normal.   Nursing note and vitals reviewed.        Assessment:       1. Feeling of chest tightness        Plan:         Feeling of chest tightness  -     POCT COVID-19 Rapid Screening    Results for orders placed or performed in visit on 10/16/20   POCT  COVID-19 Rapid Screening   Result Value Ref Range    POC Rapid COVID Negative Negative     Acceptable Yes        Other orders  -     albuterol (VENTOLIN HFA) 90 mcg/actuation inhaler; Inhale 2 puffs into the lungs every 6 (six) hours as needed for Wheezing. Rescue  Dispense: 18 g; Refill: 0    Patient only concerned about coronavirus.  I do have low suspicion for cardiac related etiology.  We did however discuss in detail that if he develops any sharp chest pain, true shortness of breath, palpitations, diaphoresis should go to ED immediately.  If symptoms do not improve by next week should follow-up with PCP.  Patient states understanding and agrees with plan.    Patient Instructions   You must understand that you've received an Urgent Care treatment only and that you may be released before all your medical problems are known or treated. You, the patient, will arrange for follow up care as instructed.  Follow up with your PCP or specialty clinic as directed in the next 1-2 weeks if not improved or as needed.  You can call [phone] to schedule an appointment with the appropriate provider.  If your condition worsens we recommend that you receive another evaluation at the emergency room immediately or contact your primary medical clinics after hours call service to discuss your concerns.  Please return here or go to the Emergency Department for any concerns or worsening of condition.          Warm/Dry

## 2020-10-17 NOTE — PATIENT INSTRUCTIONS

## 2020-11-02 DIAGNOSIS — E78.2 MIXED HYPERLIPIDEMIA: ICD-10-CM

## 2020-11-02 DIAGNOSIS — E11.9 TYPE 2 DIABETES MELLITUS WITHOUT COMPLICATION, WITHOUT LONG-TERM CURRENT USE OF INSULIN: ICD-10-CM

## 2020-11-02 DIAGNOSIS — I10 ESSENTIAL HYPERTENSION: ICD-10-CM

## 2020-11-02 RX ORDER — ROSUVASTATIN CALCIUM 20 MG/1
TABLET, COATED ORAL
Qty: 90 TABLET | Refills: 0 | Status: SHIPPED | OUTPATIENT
Start: 2020-11-02 | End: 2021-04-14

## 2020-12-27 DIAGNOSIS — I10 UNCONTROLLED HYPERTENSION: ICD-10-CM

## 2020-12-30 RX ORDER — METOPROLOL SUCCINATE 25 MG/1
TABLET, EXTENDED RELEASE ORAL
Qty: 90 TABLET | Refills: 1 | Status: SHIPPED | OUTPATIENT
Start: 2020-12-30 | End: 2022-02-26

## 2021-02-23 ENCOUNTER — OFFICE VISIT (OUTPATIENT)
Dept: URGENT CARE | Facility: CLINIC | Age: 51
End: 2021-02-23
Payer: COMMERCIAL

## 2021-02-23 VITALS
TEMPERATURE: 98 F | OXYGEN SATURATION: 97 % | DIASTOLIC BLOOD PRESSURE: 93 MMHG | SYSTOLIC BLOOD PRESSURE: 130 MMHG | RESPIRATION RATE: 15 BRPM | HEART RATE: 95 BPM

## 2021-02-23 DIAGNOSIS — U07.1 COVID-19 VIRUS DETECTED: ICD-10-CM

## 2021-02-23 DIAGNOSIS — J02.9 SORE THROAT: Primary | ICD-10-CM

## 2021-02-23 LAB
CTP QC/QA: YES
SARS-COV-2 RDRP RESP QL NAA+PROBE: POSITIVE

## 2021-02-23 PROCEDURE — 99214 PR OFFICE/OUTPT VISIT, EST, LEVL IV, 30-39 MIN: ICD-10-PCS | Mod: S$GLB,,, | Performed by: PHYSICIAN ASSISTANT

## 2021-02-23 PROCEDURE — U0002: ICD-10-PCS | Mod: QW,S$GLB,, | Performed by: PHYSICIAN ASSISTANT

## 2021-02-23 PROCEDURE — U0002 COVID-19 LAB TEST NON-CDC: HCPCS | Mod: QW,S$GLB,, | Performed by: PHYSICIAN ASSISTANT

## 2021-02-23 PROCEDURE — 99214 OFFICE O/P EST MOD 30 MIN: CPT | Mod: S$GLB,,, | Performed by: PHYSICIAN ASSISTANT

## 2021-03-15 DIAGNOSIS — E66.9 OBESITY (BMI 30.0-34.9): ICD-10-CM

## 2021-03-15 DIAGNOSIS — E03.9 ACQUIRED HYPOTHYROIDISM: ICD-10-CM

## 2021-03-15 DIAGNOSIS — E78.2 MIXED HYPERLIPIDEMIA: ICD-10-CM

## 2021-03-15 DIAGNOSIS — F41.1 GENERALIZED ANXIETY DISORDER: ICD-10-CM

## 2021-03-16 RX ORDER — LEVOTHYROXINE SODIUM 75 UG/1
TABLET ORAL
Qty: 90 TABLET | Refills: 0 | Status: SHIPPED | OUTPATIENT
Start: 2021-03-16 | End: 2021-05-21

## 2021-04-17 ENCOUNTER — TELEPHONE (OUTPATIENT)
Dept: FAMILY MEDICINE | Facility: CLINIC | Age: 51
End: 2021-04-17

## 2021-04-17 DIAGNOSIS — E55.9 VITAMIN D INSUFFICIENCY: ICD-10-CM

## 2021-04-17 DIAGNOSIS — E78.2 MIXED HYPERLIPIDEMIA: ICD-10-CM

## 2021-04-17 DIAGNOSIS — Z12.5 PROSTATE CANCER SCREENING: ICD-10-CM

## 2021-04-17 DIAGNOSIS — E11.9 TYPE 2 DIABETES MELLITUS WITHOUT COMPLICATION, WITHOUT LONG-TERM CURRENT USE OF INSULIN: Primary | ICD-10-CM

## 2021-04-17 DIAGNOSIS — I10 ESSENTIAL HYPERTENSION: ICD-10-CM

## 2021-04-17 DIAGNOSIS — E03.9 HYPOTHYROIDISM, UNSPECIFIED TYPE: ICD-10-CM

## 2021-04-21 DIAGNOSIS — E11.9 TYPE 2 DIABETES MELLITUS WITHOUT COMPLICATION, UNSPECIFIED WHETHER LONG TERM INSULIN USE: ICD-10-CM

## 2021-04-26 ENCOUNTER — LAB VISIT (OUTPATIENT)
Dept: LAB | Facility: HOSPITAL | Age: 51
End: 2021-04-26
Attending: INTERNAL MEDICINE
Payer: COMMERCIAL

## 2021-04-26 DIAGNOSIS — E03.9 HYPOTHYROIDISM, UNSPECIFIED TYPE: ICD-10-CM

## 2021-04-26 DIAGNOSIS — E78.2 MIXED HYPERLIPIDEMIA: ICD-10-CM

## 2021-04-26 DIAGNOSIS — Z12.5 PROSTATE CANCER SCREENING: ICD-10-CM

## 2021-04-26 DIAGNOSIS — E55.9 VITAMIN D INSUFFICIENCY: ICD-10-CM

## 2021-04-26 DIAGNOSIS — E11.9 TYPE 2 DIABETES MELLITUS WITHOUT COMPLICATION, WITHOUT LONG-TERM CURRENT USE OF INSULIN: ICD-10-CM

## 2021-04-26 DIAGNOSIS — I10 ESSENTIAL HYPERTENSION: ICD-10-CM

## 2021-04-26 LAB
25(OH)D3+25(OH)D2 SERPL-MCNC: 34 NG/ML (ref 30–96)
ALBUMIN SERPL BCP-MCNC: 3.8 G/DL (ref 3.5–5.2)
ALP SERPL-CCNC: 70 U/L (ref 55–135)
ALT SERPL W/O P-5'-P-CCNC: 15 U/L (ref 10–44)
ANION GAP SERPL CALC-SCNC: 9 MMOL/L (ref 8–16)
AST SERPL-CCNC: 13 U/L (ref 10–40)
BASOPHILS # BLD AUTO: 0.07 K/UL (ref 0–0.2)
BASOPHILS NFR BLD: 0.7 % (ref 0–1.9)
BILIRUB SERPL-MCNC: 0.2 MG/DL (ref 0.1–1)
BUN SERPL-MCNC: 9 MG/DL (ref 6–20)
CALCIUM SERPL-MCNC: 9.1 MG/DL (ref 8.7–10.5)
CHLORIDE SERPL-SCNC: 101 MMOL/L (ref 95–110)
CHOLEST SERPL-MCNC: 145 MG/DL (ref 120–199)
CHOLEST/HDLC SERPL: 4 {RATIO} (ref 2–5)
CO2 SERPL-SCNC: 28 MMOL/L (ref 23–29)
COMPLEXED PSA SERPL-MCNC: 1.8 NG/ML (ref 0–4)
CREAT SERPL-MCNC: 0.9 MG/DL (ref 0.5–1.4)
DIFFERENTIAL METHOD: NORMAL
EOSINOPHIL # BLD AUTO: 0.2 K/UL (ref 0–0.5)
EOSINOPHIL NFR BLD: 2.3 % (ref 0–8)
ERYTHROCYTE [DISTWIDTH] IN BLOOD BY AUTOMATED COUNT: 12.8 % (ref 11.5–14.5)
EST. GFR  (AFRICAN AMERICAN): >60 ML/MIN/1.73 M^2
EST. GFR  (NON AFRICAN AMERICAN): >60 ML/MIN/1.73 M^2
GLUCOSE SERPL-MCNC: 169 MG/DL (ref 70–110)
HCT VFR BLD AUTO: 41.4 % (ref 40–54)
HDLC SERPL-MCNC: 36 MG/DL (ref 40–75)
HDLC SERPL: 24.8 % (ref 20–50)
HGB BLD-MCNC: 14 G/DL (ref 14–18)
IMM GRANULOCYTES # BLD AUTO: 0.03 K/UL (ref 0–0.04)
IMM GRANULOCYTES NFR BLD AUTO: 0.3 % (ref 0–0.5)
LDLC SERPL CALC-MCNC: 69 MG/DL (ref 63–159)
LYMPHOCYTES # BLD AUTO: 3.4 K/UL (ref 1–4.8)
LYMPHOCYTES NFR BLD: 35.6 % (ref 18–48)
MCH RBC QN AUTO: 29.7 PG (ref 27–31)
MCHC RBC AUTO-ENTMCNC: 33.8 G/DL (ref 32–36)
MCV RBC AUTO: 88 FL (ref 82–98)
MONOCYTES # BLD AUTO: 0.9 K/UL (ref 0.3–1)
MONOCYTES NFR BLD: 9.8 % (ref 4–15)
NEUTROPHILS # BLD AUTO: 4.9 K/UL (ref 1.8–7.7)
NEUTROPHILS NFR BLD: 51.3 % (ref 38–73)
NONHDLC SERPL-MCNC: 109 MG/DL
NRBC BLD-RTO: 0 /100 WBC
PLATELET # BLD AUTO: 249 K/UL (ref 150–450)
PLATELET BLD QL SMEAR: NORMAL
PMV BLD AUTO: 10.2 FL (ref 9.2–12.9)
POTASSIUM SERPL-SCNC: 4 MMOL/L (ref 3.5–5.1)
PROT SERPL-MCNC: 7.5 G/DL (ref 6–8.4)
RBC # BLD AUTO: 4.71 M/UL (ref 4.6–6.2)
SODIUM SERPL-SCNC: 138 MMOL/L (ref 136–145)
T3FREE SERPL-MCNC: 2.7 PG/ML (ref 2.3–4.2)
T4 FREE SERPL-MCNC: 1.15 NG/DL (ref 0.71–1.51)
TRIGL SERPL-MCNC: 200 MG/DL (ref 30–150)
TSH SERPL DL<=0.005 MIU/L-ACNC: 4.3 UIU/ML (ref 0.4–4)
VIT B12 SERPL-MCNC: 518 PG/ML (ref 210–950)
WBC # BLD AUTO: 9.55 K/UL (ref 3.9–12.7)

## 2021-04-26 PROCEDURE — 82607 VITAMIN B-12: CPT | Performed by: INTERNAL MEDICINE

## 2021-04-26 PROCEDURE — 84443 ASSAY THYROID STIM HORMONE: CPT | Performed by: INTERNAL MEDICINE

## 2021-04-26 PROCEDURE — 84481 FREE ASSAY (FT-3): CPT | Performed by: INTERNAL MEDICINE

## 2021-04-26 PROCEDURE — 84439 ASSAY OF FREE THYROXINE: CPT | Performed by: INTERNAL MEDICINE

## 2021-04-26 PROCEDURE — 80061 LIPID PANEL: CPT | Performed by: INTERNAL MEDICINE

## 2021-04-26 PROCEDURE — 83036 HEMOGLOBIN GLYCOSYLATED A1C: CPT | Performed by: INTERNAL MEDICINE

## 2021-04-26 PROCEDURE — 80053 COMPREHEN METABOLIC PANEL: CPT | Performed by: INTERNAL MEDICINE

## 2021-04-26 PROCEDURE — 85025 COMPLETE CBC W/AUTO DIFF WBC: CPT | Performed by: INTERNAL MEDICINE

## 2021-04-26 PROCEDURE — 84153 ASSAY OF PSA TOTAL: CPT | Performed by: INTERNAL MEDICINE

## 2021-04-26 PROCEDURE — 36415 COLL VENOUS BLD VENIPUNCTURE: CPT | Mod: PN | Performed by: INTERNAL MEDICINE

## 2021-04-26 PROCEDURE — 82306 VITAMIN D 25 HYDROXY: CPT | Performed by: INTERNAL MEDICINE

## 2021-04-27 LAB
ESTIMATED AVG GLUCOSE: 220 MG/DL (ref 68–131)
HBA1C MFR BLD: 9.3 % (ref 4–5.6)

## 2021-05-06 ENCOUNTER — TELEPHONE (OUTPATIENT)
Dept: FAMILY MEDICINE | Facility: CLINIC | Age: 51
End: 2021-05-06

## 2021-05-13 ENCOUNTER — TELEPHONE (OUTPATIENT)
Dept: FAMILY MEDICINE | Facility: CLINIC | Age: 51
End: 2021-05-13

## 2021-05-15 ENCOUNTER — TELEPHONE (OUTPATIENT)
Dept: FAMILY MEDICINE | Facility: CLINIC | Age: 51
End: 2021-05-15

## 2021-05-21 ENCOUNTER — OFFICE VISIT (OUTPATIENT)
Dept: FAMILY MEDICINE | Facility: CLINIC | Age: 51
End: 2021-05-21
Payer: COMMERCIAL

## 2021-05-21 VITALS
OXYGEN SATURATION: 95 % | BODY MASS INDEX: 31.65 KG/M2 | HEART RATE: 82 BPM | WEIGHT: 226.06 LBS | SYSTOLIC BLOOD PRESSURE: 128 MMHG | DIASTOLIC BLOOD PRESSURE: 88 MMHG | HEIGHT: 71 IN

## 2021-05-21 DIAGNOSIS — G89.29 CHRONIC PAIN OF RIGHT KNEE: ICD-10-CM

## 2021-05-21 DIAGNOSIS — E78.2 MIXED HYPERLIPIDEMIA: ICD-10-CM

## 2021-05-21 DIAGNOSIS — Z01.89 ENCOUNTER FOR LABORATORY TEST: ICD-10-CM

## 2021-05-21 DIAGNOSIS — R73.9 HYPERGLYCEMIA: ICD-10-CM

## 2021-05-21 DIAGNOSIS — M17.11 PRIMARY OSTEOARTHRITIS OF RIGHT KNEE: ICD-10-CM

## 2021-05-21 DIAGNOSIS — M79.609 POPLITEAL PAIN: ICD-10-CM

## 2021-05-21 DIAGNOSIS — F41.1 GENERALIZED ANXIETY DISORDER: ICD-10-CM

## 2021-05-21 DIAGNOSIS — S89.91XA KNEE INJURY, RIGHT, INITIAL ENCOUNTER: ICD-10-CM

## 2021-05-21 DIAGNOSIS — E11.9 TYPE 2 DIABETES MELLITUS WITHOUT COMPLICATION, WITHOUT LONG-TERM CURRENT USE OF INSULIN: Primary | ICD-10-CM

## 2021-05-21 DIAGNOSIS — E03.9 HYPOTHYROIDISM, UNSPECIFIED TYPE: ICD-10-CM

## 2021-05-21 DIAGNOSIS — M25.561 CHRONIC PAIN OF RIGHT KNEE: ICD-10-CM

## 2021-05-21 PROCEDURE — 3046F HEMOGLOBIN A1C LEVEL >9.0%: CPT | Mod: CPTII,S$GLB,, | Performed by: INTERNAL MEDICINE

## 2021-05-21 PROCEDURE — 99999 PR PBB SHADOW E&M-EST. PATIENT-LVL V: ICD-10-PCS | Mod: PBBFAC,,, | Performed by: INTERNAL MEDICINE

## 2021-05-21 PROCEDURE — 3079F DIAST BP 80-89 MM HG: CPT | Mod: CPTII,S$GLB,, | Performed by: INTERNAL MEDICINE

## 2021-05-21 PROCEDURE — 99999 PR PBB SHADOW E&M-EST. PATIENT-LVL V: CPT | Mod: PBBFAC,,, | Performed by: INTERNAL MEDICINE

## 2021-05-21 PROCEDURE — 3074F PR MOST RECENT SYSTOLIC BLOOD PRESSURE < 130 MM HG: ICD-10-PCS | Mod: CPTII,S$GLB,, | Performed by: INTERNAL MEDICINE

## 2021-05-21 PROCEDURE — 3079F PR MOST RECENT DIASTOLIC BLOOD PRESSURE 80-89 MM HG: ICD-10-PCS | Mod: CPTII,S$GLB,, | Performed by: INTERNAL MEDICINE

## 2021-05-21 PROCEDURE — 3008F PR BODY MASS INDEX (BMI) DOCUMENTED: ICD-10-PCS | Mod: CPTII,S$GLB,, | Performed by: INTERNAL MEDICINE

## 2021-05-21 PROCEDURE — 99215 PR OFFICE/OUTPT VISIT, EST, LEVL V, 40-54 MIN: ICD-10-PCS | Mod: S$GLB,,, | Performed by: INTERNAL MEDICINE

## 2021-05-21 PROCEDURE — 3074F SYST BP LT 130 MM HG: CPT | Mod: CPTII,S$GLB,, | Performed by: INTERNAL MEDICINE

## 2021-05-21 PROCEDURE — 3046F PR MOST RECENT HEMOGLOBIN A1C LEVEL > 9.0%: ICD-10-PCS | Mod: CPTII,S$GLB,, | Performed by: INTERNAL MEDICINE

## 2021-05-21 PROCEDURE — 3008F BODY MASS INDEX DOCD: CPT | Mod: CPTII,S$GLB,, | Performed by: INTERNAL MEDICINE

## 2021-05-21 PROCEDURE — 99215 OFFICE O/P EST HI 40 MIN: CPT | Mod: S$GLB,,, | Performed by: INTERNAL MEDICINE

## 2021-05-21 RX ORDER — GLIMEPIRIDE 2 MG/1
TABLET ORAL
Qty: 90 TABLET | Refills: 1 | Status: SHIPPED | OUTPATIENT
Start: 2021-05-21 | End: 2021-08-18

## 2021-05-21 RX ORDER — MELOXICAM 15 MG/1
TABLET ORAL
Qty: 30 TABLET | Refills: 2 | Status: SHIPPED | OUTPATIENT
Start: 2021-05-21 | End: 2021-08-18

## 2021-05-21 RX ORDER — LEVOTHYROXINE SODIUM 88 UG/1
88 TABLET ORAL
Qty: 90 TABLET | Refills: 1 | Status: SHIPPED | OUTPATIENT
Start: 2021-05-21 | End: 2022-09-24

## 2021-06-05 ENCOUNTER — TELEPHONE (OUTPATIENT)
Dept: FAMILY MEDICINE | Facility: CLINIC | Age: 51
End: 2021-06-05

## 2021-06-05 DIAGNOSIS — E03.9 HYPOTHYROIDISM, UNSPECIFIED TYPE: Primary | ICD-10-CM

## 2021-08-18 DIAGNOSIS — R73.9 HYPERGLYCEMIA: ICD-10-CM

## 2021-08-18 DIAGNOSIS — M79.609 POPLITEAL PAIN: ICD-10-CM

## 2021-08-18 DIAGNOSIS — E11.9 TYPE 2 DIABETES MELLITUS WITHOUT COMPLICATION, WITHOUT LONG-TERM CURRENT USE OF INSULIN: ICD-10-CM

## 2021-08-18 DIAGNOSIS — M17.11 PRIMARY OSTEOARTHRITIS OF RIGHT KNEE: ICD-10-CM

## 2021-08-18 RX ORDER — GLIMEPIRIDE 2 MG/1
TABLET ORAL
Qty: 90 TABLET | Refills: 2 | Status: SHIPPED | OUTPATIENT
Start: 2021-08-18 | End: 2022-04-06

## 2021-08-18 RX ORDER — MELOXICAM 15 MG/1
TABLET ORAL
Qty: 30 TABLET | Refills: 2 | Status: SHIPPED | OUTPATIENT
Start: 2021-08-18

## 2021-10-25 ENCOUNTER — PATIENT MESSAGE (OUTPATIENT)
Dept: ADMINISTRATIVE | Facility: HOSPITAL | Age: 51
End: 2021-10-25
Payer: COMMERCIAL

## 2021-12-20 ENCOUNTER — PATIENT MESSAGE (OUTPATIENT)
Dept: ADMINISTRATIVE | Facility: HOSPITAL | Age: 51
End: 2021-12-20
Payer: COMMERCIAL

## 2021-12-20 ENCOUNTER — PATIENT OUTREACH (OUTPATIENT)
Dept: ADMINISTRATIVE | Facility: HOSPITAL | Age: 51
End: 2021-12-20
Payer: COMMERCIAL

## 2021-12-22 DIAGNOSIS — E11.9 TYPE 2 DIABETES MELLITUS WITHOUT COMPLICATION: ICD-10-CM

## 2022-01-02 ENCOUNTER — PATIENT MESSAGE (OUTPATIENT)
Dept: ADMINISTRATIVE | Facility: HOSPITAL | Age: 52
End: 2022-01-02
Payer: COMMERCIAL

## 2022-01-05 DIAGNOSIS — Z12.11 COLON CANCER SCREENING: ICD-10-CM

## 2022-01-24 ENCOUNTER — PATIENT MESSAGE (OUTPATIENT)
Dept: ADMINISTRATIVE | Facility: HOSPITAL | Age: 52
End: 2022-01-24
Payer: COMMERCIAL

## 2022-01-28 ENCOUNTER — PATIENT MESSAGE (OUTPATIENT)
Dept: ADMINISTRATIVE | Facility: HOSPITAL | Age: 52
End: 2022-01-28
Payer: COMMERCIAL

## 2022-01-28 ENCOUNTER — PATIENT OUTREACH (OUTPATIENT)
Dept: ADMINISTRATIVE | Facility: HOSPITAL | Age: 52
End: 2022-01-28
Payer: COMMERCIAL

## 2022-01-28 DIAGNOSIS — Z00.00 ROUTINE GENERAL MEDICAL EXAMINATION AT A HEALTH CARE FACILITY: Primary | ICD-10-CM

## 2022-01-28 NOTE — PROGRESS NOTES
Non-compliant report chart audits DIABETIC LAB A1C.   Chart review completed for HM test overdue (mammograms, Colonoscopies, pap smears, DM labs, and/or EYE EXAMs)      Care Everywhere and media, updates requested and reviewed.      [employer] and [facility] reviewed for tests needed.      RE:  Patient needs diabetic lab A1C.      Outreach to patient    Pt out of town    Portal message sent    Will respond when in town to schedule    WOGs entered      Outreach:  Diabetic lab A1C

## 2022-02-12 NOTE — TELEPHONE ENCOUNTER
Attempted to contact patient in regards to r/s ov, no answer, lvm to return call to clinic on M/H #.  
Yes

## 2022-02-17 ENCOUNTER — PATIENT MESSAGE (OUTPATIENT)
Dept: ADMINISTRATIVE | Facility: HOSPITAL | Age: 52
End: 2022-02-17
Payer: COMMERCIAL

## 2022-02-17 ENCOUNTER — PATIENT OUTREACH (OUTPATIENT)
Dept: ADMINISTRATIVE | Facility: HOSPITAL | Age: 52
End: 2022-02-17
Payer: COMMERCIAL

## 2022-02-17 NOTE — LETTER
February 25, 2022    Rashid Del Valle  [address] [place] [address]             [facility]  [address]  [facility] [address]  Phone: [phone] Dear William, [facility] wants to help patients achieve their health goals. Our records show that you may have been told you have diabetes.     Diabetes is a medical condition that needs to be managed all the time to reduce your risk of things like heart, kidney and eye disease. Your [facility] primary care team wants to be sure you get important tests and screenings done regularly to assure that your health needs are met.  If you believe this diagnosis of diabetes is in error, please let your primary care physician or care team know so that he/she can update your health record.     Our records indicate you may be overdue for the following:     Topic   Eye Exam    Foot Exam   Diabetes Urine Screening   Hemoglobin A1C        If you recently had any of the above test done at another facility, please let your primary care provider know so that they can update your health record.  Please send a message to your primary care physician via [email or web address] or contact his/her office at [phone]. If you have a copy of these records, please provide a copy so that we may update your records.  Also, You are welcome to request that the report be faxed to us at ([phone]).       If you have an upcoming scheduled appointment for the above test and/or procedures, please disregard this letter.  Otherwise, please send a message via [email or web address] or by calling [phone] and we will assist in scheduling these appointments for you.      Sincerely,     Matteo Torre MD and your [facility] Team

## 2022-02-24 DIAGNOSIS — I10 UNCONTROLLED HYPERTENSION: ICD-10-CM

## 2022-02-25 NOTE — TELEPHONE ENCOUNTER
Care Due:                  Date            Visit Type   Department     Provider  --------------------------------------------------------------------------------                                EP -                              PRIMARY      [facility] FAMILY  Last Visit: 05-      CARE ([facility])   MEDICINE       Matteo Torre  Next Visit: None Scheduled  None         None Found                                                            Last  Test          Frequency    Reason                     Performed    Due Date  --------------------------------------------------------------------------------    Office Visit  12 months..  glimepiride,               05- 05-                             levothyroxine, metFORMIN,                             rosuvastatin.............    Lipid Panel.  12 months..  rosuvastatin.............  04- 04-    TSH.........  12 months..  levothyroxine............  04- 04-    Powered by [employer]. Reference number: [id number].   2/24/2022 7:22:39 PM CST

## 2022-02-26 RX ORDER — METOPROLOL SUCCINATE 25 MG/1
TABLET, EXTENDED RELEASE ORAL
Qty: 90 TABLET | Refills: 1 | Status: SHIPPED | OUTPATIENT
Start: 2022-02-26 | End: 2022-12-30

## 2022-02-26 NOTE — TELEPHONE ENCOUNTER
Provider Staff:     Action is required for this patient.   Please see care gap opportunities below in Care Due Message.     Thanks!  [facility]     Appointments      Date Provider   Last Visit   5/21/2021 Matteo Torre MD   Next Visit   Visit date not found Matteo Torre MD     Note composed:2:44 PM 02/26/2022

## 2022-03-10 ENCOUNTER — PATIENT OUTREACH (OUTPATIENT)
Dept: ADMINISTRATIVE | Facility: HOSPITAL | Age: 52
End: 2022-03-10
Payer: COMMERCIAL

## 2022-03-10 NOTE — LETTER
March 10, 2022    Rashid Del Valle  [address] [place] [address]             [facility]  [address]  [place] [address]  Phone: [phone] Dear Rashid Del Valle    Your [facility] primary care team is dedicated to assisting you achieve your health goal.  In order to maintain your goal, scheduling your diabetic health maintenance requirements are chaves to successful disease management.     Matteo Torre MD has reviewed your records and found that you are overdue for your diabetic eye exam.  Yearly eye exams are especially important, as this can identify early eye complications and disease caused by your diabetes.  Matteo Torre MD has requested you schedule your diabetic eye exam and encourages you to schedule at any of the [facility] locations.  You can be seen conveniently at the [facility] location by calling [phone].      If you have already completed this exam at an outside facility, please notify us so we may request a copy of the exam and update your chart.     Sincerely,     Matteo Torre MD and your [facility] Team

## 2022-04-05 ENCOUNTER — PATIENT OUTREACH (OUTPATIENT)
Dept: ADMINISTRATIVE | Facility: HOSPITAL | Age: 52
End: 2022-04-05
Payer: COMMERCIAL

## 2022-04-05 NOTE — PROGRESS NOTES
Non-compliant report chart audits for COLON CANCER SCREENING. Chart review completed for HM test overdue (mammograms, Colonoscopies, pap smears, DM labs, and/or EYE EXAMs)      Care Everywhere and media, updates requested and reviewed.      Outreach to patient in reference to colon cancer screening.  RE:  Patient needs colon cancer screening    Outreach:  Colon cancer screening

## 2022-04-05 NOTE — PROGRESS NOTES
Non-compliant report chart audits HGBA1C.        Care Everywhere and media, updates requested and reviewed.      [facility] and [facility] reviewed for tests needed.    Outreach to patient    Outreach:  HGBA1C

## 2022-04-05 NOTE — PROGRESS NOTES
Non-compliant report chart audits DIABETIC EYE EXAM.   Chart review completed for HM test overdue (mammograms, Colonoscopies, pap smears, DM labs, and/or EYE EXAMs)      Care Everywhere and media, updates requested and reviewed.      RE:  Patient needs diabetic eye exam.    Outreach to patient.      Outreach:  Diabetic eye exam

## 2022-04-07 ENCOUNTER — PATIENT MESSAGE (OUTPATIENT)
Dept: FAMILY MEDICINE | Facility: CLINIC | Age: 52
End: 2022-04-07
Payer: COMMERCIAL

## 2022-04-20 ENCOUNTER — PATIENT OUTREACH (OUTPATIENT)
Dept: ADMINISTRATIVE | Facility: HOSPITAL | Age: 52
End: 2022-04-20
Payer: COMMERCIAL

## 2022-04-25 ENCOUNTER — PATIENT MESSAGE (OUTPATIENT)
Dept: ADMINISTRATIVE | Facility: HOSPITAL | Age: 52
End: 2022-04-25
Payer: COMMERCIAL

## 2022-05-06 ENCOUNTER — PATIENT OUTREACH (OUTPATIENT)
Dept: ADMINISTRATIVE | Facility: HOSPITAL | Age: 52
End: 2022-05-06
Payer: COMMERCIAL

## 2022-05-06 NOTE — PROGRESS NOTES
Non-compliant report chart audits HGBA1C.        Care Everywhere and media, updates requested and reviewed.      [facility] and [facility] reviewed for tests needed.    Outreach to patient    Rescheduled labs and visit    Outreach:  HGBA1C

## 2022-07-19 ENCOUNTER — LAB VISIT (OUTPATIENT)
Dept: LAB | Facility: HOSPITAL | Age: 52
End: 2022-07-19
Attending: INTERNAL MEDICINE
Payer: COMMERCIAL

## 2022-07-19 DIAGNOSIS — E11.9 TYPE 2 DIABETES MELLITUS WITHOUT COMPLICATION, WITHOUT LONG-TERM CURRENT USE OF INSULIN: ICD-10-CM

## 2022-07-19 DIAGNOSIS — Z91.89 AT RISK FOR SIDE EFFECT OF MEDICATION: ICD-10-CM

## 2022-07-19 DIAGNOSIS — R73.9 HYPERGLYCEMIA: ICD-10-CM

## 2022-07-19 DIAGNOSIS — E78.2 MIXED HYPERLIPIDEMIA: ICD-10-CM

## 2022-07-19 LAB
ALBUMIN SERPL BCP-MCNC: 4.6 G/DL (ref 3.5–5.2)
ALP SERPL-CCNC: 80 U/L (ref 55–135)
ALT SERPL W/O P-5'-P-CCNC: 21 U/L (ref 10–44)
ANION GAP SERPL CALC-SCNC: 13 MMOL/L (ref 8–16)
AST SERPL-CCNC: 17 U/L (ref 10–40)
BILIRUB SERPL-MCNC: 0.7 MG/DL (ref 0.1–1)
BUN SERPL-MCNC: 15 MG/DL (ref 6–20)
CALCIUM SERPL-MCNC: 10.4 MG/DL (ref 8.7–10.5)
CHLORIDE SERPL-SCNC: 99 MMOL/L (ref 95–110)
CHOLEST SERPL-MCNC: 264 MG/DL (ref 120–199)
CHOLEST/HDLC SERPL: 6.4 {RATIO} (ref 2–5)
CO2 SERPL-SCNC: 26 MMOL/L (ref 23–29)
CREAT SERPL-MCNC: 1.2 MG/DL (ref 0.5–1.4)
EST. GFR  (AFRICAN AMERICAN): >60 ML/MIN/1.73 M^2
EST. GFR  (NON AFRICAN AMERICAN): >60 ML/MIN/1.73 M^2
GLUCOSE SERPL-MCNC: 183 MG/DL (ref 70–110)
HDLC SERPL-MCNC: 41 MG/DL (ref 40–75)
HDLC SERPL: 15.5 % (ref 20–50)
LDLC SERPL CALC-MCNC: 156.8 MG/DL (ref 63–159)
NONHDLC SERPL-MCNC: 223 MG/DL
POTASSIUM SERPL-SCNC: 4.1 MMOL/L (ref 3.5–5.1)
PROT SERPL-MCNC: 8.7 G/DL (ref 6–8.4)
SODIUM SERPL-SCNC: 138 MMOL/L (ref 136–145)
TRIGL SERPL-MCNC: 331 MG/DL (ref 30–150)
VIT B12 SERPL-MCNC: 642 PG/ML (ref 210–950)

## 2022-07-19 PROCEDURE — 80061 LIPID PANEL: CPT | Performed by: INTERNAL MEDICINE

## 2022-07-19 PROCEDURE — 80053 COMPREHEN METABOLIC PANEL: CPT | Performed by: INTERNAL MEDICINE

## 2022-07-19 PROCEDURE — 82607 VITAMIN B-12: CPT | Performed by: INTERNAL MEDICINE

## 2022-07-19 PROCEDURE — 36415 COLL VENOUS BLD VENIPUNCTURE: CPT | Mod: PN | Performed by: INTERNAL MEDICINE

## 2022-08-15 DIAGNOSIS — R73.9 HYPERGLYCEMIA: ICD-10-CM

## 2022-08-15 DIAGNOSIS — E11.9 TYPE 2 DIABETES MELLITUS WITHOUT COMPLICATION, WITHOUT LONG-TERM CURRENT USE OF INSULIN: ICD-10-CM

## 2022-08-15 RX ORDER — METFORMIN HYDROCHLORIDE 1000 MG/1
1000 TABLET ORAL 2 TIMES DAILY WITH MEALS
Qty: 180 TABLET | Refills: 0 | Status: SHIPPED | OUTPATIENT
Start: 2022-08-15 | End: 2022-09-28

## 2022-08-15 RX ORDER — GLIMEPIRIDE 2 MG/1
2 TABLET ORAL
Qty: 30 TABLET | Refills: 0 | Status: SHIPPED | OUTPATIENT
Start: 2022-08-15 | End: 2022-09-28

## 2022-08-15 NOTE — TELEPHONE ENCOUNTER
Care Due:                  Date            Visit Type   Department     Provider  --------------------------------------------------------------------------------                                EP -                              PRIMARY      [facility] FAMILY  Last Visit: 05-      CARE ([facility])   MEDICINE       Matteo Torre  Next Visit: None Scheduled  None         None Found                                                            Last  Test          Frequency    Reason                     Performed    Due Date  --------------------------------------------------------------------------------    Office Visit  12 months..  glimepiride, metFORMIN,    05- 05-                             rosuvastatin.............    HBA1C.......  6 months...  glimepiride, metFORMIN...  04-   10-    [facility] Embedded Care Gaps. Reference number: [id number]. 8/15/2022   3:42:07 PM CDT

## 2022-08-18 ENCOUNTER — OFFICE VISIT (OUTPATIENT)
Dept: FAMILY MEDICINE | Facility: CLINIC | Age: 52
End: 2022-08-18
Payer: COMMERCIAL

## 2022-08-18 VITALS
TEMPERATURE: 98 F | WEIGHT: 219.69 LBS | HEART RATE: 82 BPM | SYSTOLIC BLOOD PRESSURE: 142 MMHG | BODY MASS INDEX: 30.76 KG/M2 | DIASTOLIC BLOOD PRESSURE: 102 MMHG | RESPIRATION RATE: 14 BRPM | HEIGHT: 71 IN

## 2022-08-18 DIAGNOSIS — R80.9 TYPE 2 DIABETES MELLITUS WITH MICROALBUMINURIA, WITHOUT LONG-TERM CURRENT USE OF INSULIN: ICD-10-CM

## 2022-08-18 DIAGNOSIS — F41.1 GENERALIZED ANXIETY DISORDER: ICD-10-CM

## 2022-08-18 DIAGNOSIS — Z28.39 IMMUNIZATION DEFICIENCY: ICD-10-CM

## 2022-08-18 DIAGNOSIS — E03.9 ACQUIRED HYPOTHYROIDISM: ICD-10-CM

## 2022-08-18 DIAGNOSIS — E78.2 MIXED HYPERLIPIDEMIA: ICD-10-CM

## 2022-08-18 DIAGNOSIS — E66.9 OBESITY (BMI 30.0-34.9): Primary | ICD-10-CM

## 2022-08-18 DIAGNOSIS — E11.29 TYPE 2 DIABETES MELLITUS WITH MICROALBUMINURIA, WITHOUT LONG-TERM CURRENT USE OF INSULIN: ICD-10-CM

## 2022-08-18 PROCEDURE — 1159F PR MEDICATION LIST DOCUMENTED IN MEDICAL RECORD: ICD-10-PCS | Mod: CPTII,S$GLB,, | Performed by: PHYSICIAN ASSISTANT

## 2022-08-18 PROCEDURE — 99999 PR PBB SHADOW E&M-EST. PATIENT-LVL IV: CPT | Mod: PBBFAC,,, | Performed by: PHYSICIAN ASSISTANT

## 2022-08-18 PROCEDURE — 1160F RVW MEDS BY RX/DR IN RCRD: CPT | Mod: CPTII,S$GLB,, | Performed by: PHYSICIAN ASSISTANT

## 2022-08-18 PROCEDURE — 99999 PR PBB SHADOW E&M-EST. PATIENT-LVL IV: ICD-10-PCS | Mod: PBBFAC,,, | Performed by: PHYSICIAN ASSISTANT

## 2022-08-18 PROCEDURE — 1159F MED LIST DOCD IN RCRD: CPT | Mod: CPTII,S$GLB,, | Performed by: PHYSICIAN ASSISTANT

## 2022-08-18 PROCEDURE — 3061F PR NEG MICROALBUMINURIA RESULT DOCUMENTED/REVIEW: ICD-10-PCS | Mod: CPTII,S$GLB,, | Performed by: PHYSICIAN ASSISTANT

## 2022-08-18 PROCEDURE — 3008F BODY MASS INDEX DOCD: CPT | Mod: CPTII,S$GLB,, | Performed by: PHYSICIAN ASSISTANT

## 2022-08-18 PROCEDURE — 3080F PR MOST RECENT DIASTOLIC BLOOD PRESSURE >= 90 MM HG: ICD-10-PCS | Mod: CPTII,S$GLB,, | Performed by: PHYSICIAN ASSISTANT

## 2022-08-18 PROCEDURE — 1160F PR REVIEW ALL MEDS BY PRESCRIBER/CLIN PHARMACIST DOCUMENTED: ICD-10-PCS | Mod: CPTII,S$GLB,, | Performed by: PHYSICIAN ASSISTANT

## 2022-08-18 PROCEDURE — 3077F SYST BP >= 140 MM HG: CPT | Mod: CPTII,S$GLB,, | Performed by: PHYSICIAN ASSISTANT

## 2022-08-18 PROCEDURE — 99214 PR OFFICE/OUTPT VISIT, EST, LEVL IV, 30-39 MIN: ICD-10-PCS | Mod: S$GLB,,, | Performed by: PHYSICIAN ASSISTANT

## 2022-08-18 PROCEDURE — 3077F PR MOST RECENT SYSTOLIC BLOOD PRESSURE >= 140 MM HG: ICD-10-PCS | Mod: CPTII,S$GLB,, | Performed by: PHYSICIAN ASSISTANT

## 2022-08-18 PROCEDURE — 3066F PR DOCUMENTATION OF TREATMENT FOR NEPHROPATHY: ICD-10-PCS | Mod: CPTII,S$GLB,, | Performed by: PHYSICIAN ASSISTANT

## 2022-08-18 PROCEDURE — 3066F NEPHROPATHY DOC TX: CPT | Mod: CPTII,S$GLB,, | Performed by: PHYSICIAN ASSISTANT

## 2022-08-18 PROCEDURE — 3080F DIAST BP >= 90 MM HG: CPT | Mod: CPTII,S$GLB,, | Performed by: PHYSICIAN ASSISTANT

## 2022-08-18 PROCEDURE — 99214 OFFICE O/P EST MOD 30 MIN: CPT | Mod: S$GLB,,, | Performed by: PHYSICIAN ASSISTANT

## 2022-08-18 PROCEDURE — 3061F NEG MICROALBUMINURIA REV: CPT | Mod: CPTII,S$GLB,, | Performed by: PHYSICIAN ASSISTANT

## 2022-08-18 PROCEDURE — 3008F PR BODY MASS INDEX (BMI) DOCUMENTED: ICD-10-PCS | Mod: CPTII,S$GLB,, | Performed by: PHYSICIAN ASSISTANT

## 2022-08-18 NOTE — PROGRESS NOTES
Subjective:       Patient ID: Rashid Del Valle is a 51 y.o. male.    Chief Complaint: Blood Sugar Problem    HPI  Elevated glucose levels  Last A1c a yr. Ago was 9.3  Fasting glucose last month  183  Eye exam due  Review of Systems   Constitutional: Negative.  Negative for activity change, appetite change, chills, diaphoresis, fatigue, fever and unexpected weight change.   HENT: Negative.     Eyes: Negative.    Respiratory: Negative.  Negative for cough and shortness of breath.    Cardiovascular: Negative.  Negative for chest pain and leg swelling.   Gastrointestinal: Negative.    Endocrine: Negative.    Genitourinary: Negative.    Musculoskeletal: Negative.    Integumentary:  Negative for rash. Negative.   Neurological: Negative.    Psychiatric/Behavioral:  The patient is nervous/anxious.        Objective:      Physical Exam  Constitutional:       General: He is not in acute distress.     Appearance: Normal appearance. He is obese. He is not ill-appearing, toxic-appearing or diaphoretic.   HENT:      Head: Normocephalic and atraumatic.      Right Ear: Tympanic membrane, ear canal and external ear normal. There is no impacted cerumen.      Left Ear: Tympanic membrane, ear canal and external ear normal. There is no impacted cerumen.      Nose: Nose normal.      Mouth/Throat:      Mouth: Mucous membranes are moist.      Pharynx: Oropharynx is clear. No oropharyngeal exudate or posterior oropharyngeal erythema.   Eyes:      General: No scleral icterus.     Conjunctiva/sclera: Conjunctivae normal.   Neck:      Vascular: No carotid bruit.   Cardiovascular:      Rate and Rhythm: Normal rate and regular rhythm.      Pulses: Normal pulses.           Dorsalis pedis pulses are 2+ on the right side and 2+ on the left side.        Posterior tibial pulses are 2+ on the right side and 2+ on the left side.      Heart sounds: Normal heart sounds. No murmur heard.    No friction rub. No gallop.   Pulmonary:      Effort: Pulmonary  effort is normal. No respiratory distress.      Breath sounds: Normal breath sounds. No stridor. No wheezing, rhonchi or rales.   Abdominal:      General: Abdomen is flat. Bowel sounds are normal. There is no distension.      Palpations: Abdomen is soft. There is no mass.      Tenderness: There is no abdominal tenderness. There is no guarding or rebound.      Hernia: No hernia is present.   Musculoskeletal:         General: No swelling.      Cervical back: Normal range of motion and neck supple. No rigidity or tenderness.      Right lower leg: No edema.      Left lower leg: No edema.      Right foot: Normal range of motion. No deformity, bunion, Charcot foot, foot drop or prominent metatarsal heads.      Left foot: Normal range of motion. No deformity, bunion, Charcot foot, foot drop or prominent metatarsal heads.   Feet:      Right foot:      Protective Sensation: 10 sites tested.  10 sites sensed.      Skin integrity: Dry skin present.      Toenail Condition: Fungal disease present.     Left foot:      Protective Sensation: 10 sites tested.  10 sites sensed.      Skin integrity: Dry skin present.      Toenail Condition: Fungal disease present.  Lymphadenopathy:      Cervical: No cervical adenopathy.   Skin:     General: Skin is warm and dry.      Findings: No rash.   Neurological:      General: No focal deficit present.      Mental Status: He is alert and oriented to person, place, and time.       Assessment:       Problem List Items Addressed This Visit       Mixed hyperlipidemia    Relevant Orders    Comprehensive Metabolic Panel    CBC Auto Differential    Hemoglobin A1C    TSH    Obesity (BMI 30.0-34.9) - Primary    Relevant Orders    Comprehensive Metabolic Panel    CBC Auto Differential    Hemoglobin A1C    TSH    Acquired hypothyroidism    Relevant Orders    Comprehensive Metabolic Panel    CBC Auto Differential    Hemoglobin A1C    TSH    Generalized anxiety disorder    Relevant Orders    Comprehensive  Metabolic Panel    CBC Auto Differential    Hemoglobin A1C    TSH    Type 2 diabetes mellitus with microalbuminuria, without long-term current use of insulin    Relevant Orders    Comprehensive Metabolic Panel    CBC Auto Differential    Hemoglobin A1C    TSH     Other Visit Diagnoses       Immunization deficiency                Plan:       Rashid was seen today for blood sugar problem.    Diagnoses and all orders for this visit:    Obesity (BMI 30.0-34.9)  -     Comprehensive Metabolic Panel; Future  -     CBC Auto Differential; Future  -     Hemoglobin A1C; Future  -     TSH; Future    Type 2 diabetes mellitus with microalbuminuria, without long-term current use of insulin  -     Comprehensive Metabolic Panel; Future  -     CBC Auto Differential; Future  -     Hemoglobin A1C; Future  -     TSH; Future    Acquired hypothyroidism  -     Comprehensive Metabolic Panel; Future  -     CBC Auto Differential; Future  -     Hemoglobin A1C; Future  -     TSH; Future    Mixed hyperlipidemia  -     Comprehensive Metabolic Panel; Future  -     CBC Auto Differential; Future  -     Hemoglobin A1C; Future  -     TSH; Future    Generalized anxiety disorder  -     Comprehensive Metabolic Panel; Future  -     CBC Auto Differential; Future  -     Hemoglobin A1C; Future  -     TSH; Future    Immunization deficiency       Discussed diet   Discussed exercise  Discussed immunization needs  Schedule eye exam

## 2022-09-21 DIAGNOSIS — E03.9 HYPOTHYROIDISM, UNSPECIFIED TYPE: ICD-10-CM

## 2022-09-22 NOTE — TELEPHONE ENCOUNTER
No new care gaps identified.  [facility] Embedded Care Gaps. Reference number: [id number]. 9/21/2022   8:52:48 PM CDT

## 2022-09-24 RX ORDER — LEVOTHYROXINE SODIUM 88 UG/1
TABLET ORAL
Qty: 50 TABLET | Refills: 0 | Status: SHIPPED | OUTPATIENT
Start: 2022-09-24 | End: 2023-04-16

## 2022-09-28 ENCOUNTER — PATIENT MESSAGE (OUTPATIENT)
Dept: FAMILY MEDICINE | Facility: CLINIC | Age: 52
End: 2022-09-28
Payer: COMMERCIAL

## 2022-09-30 ENCOUNTER — PATIENT OUTREACH (OUTPATIENT)
Dept: ADMINISTRATIVE | Facility: HOSPITAL | Age: 52
End: 2022-09-30
Payer: COMMERCIAL

## 2022-09-30 NOTE — PROGRESS NOTES
[employer] non-compliant report chart audits DIABETIC EYE EXAM      Care Everywhere and media, updates requested and reviewed.      RE:  Patient needs diabetic eye exam.    Outreach to patient.      Pt will call to schedule eye exam after completion of labs      Outreach:  Diabetic eye exam

## 2022-10-10 ENCOUNTER — PATIENT OUTREACH (OUTPATIENT)
Dept: ADMINISTRATIVE | Facility: HOSPITAL | Age: 52
End: 2022-10-10
Payer: COMMERCIAL

## 2022-10-19 DIAGNOSIS — E11.9 TYPE 2 DIABETES MELLITUS WITHOUT COMPLICATION, UNSPECIFIED WHETHER LONG TERM INSULIN USE: ICD-10-CM

## 2022-10-24 ENCOUNTER — PATIENT MESSAGE (OUTPATIENT)
Dept: ADMINISTRATIVE | Facility: HOSPITAL | Age: 52
End: 2022-10-24
Payer: COMMERCIAL

## 2022-11-03 ENCOUNTER — PATIENT MESSAGE (OUTPATIENT)
Dept: ADMINISTRATIVE | Facility: HOSPITAL | Age: 52
End: 2022-11-03
Payer: COMMERCIAL

## 2022-11-03 ENCOUNTER — PATIENT OUTREACH (OUTPATIENT)
Dept: ADMINISTRATIVE | Facility: HOSPITAL | Age: 52
End: 2022-11-03
Payer: COMMERCIAL

## 2022-11-03 NOTE — LETTER
November 10, 2022    Rashid Del Valle  [address] [place] [place] [address]             [facility]  [address]  [facility] [address]  Phone: [phone] Dear William [facility] is committed to your overall health.  To help you get the most out of each of your visits, we will review your information to make sure you are up to date on all of your recommended tests and/or procedures.       Matteo Torre MD  has found that your chart shows you may be due for :         Health Maintenance Due   Topic    Hepatitis C Screening     Pneumococcal Vaccines     Eye Exam     HIV Screening     TETANUS VACCINE     Colorectal Cancer Screening     Shingles Vaccine     Hemoglobin A1c     COVID-19 Vaccine (3 - Booster for Pfizer series)    Influenza Vaccine         If you have had any of the above done at another facility, please bring the records or information with you so that your record at [facility] will be complete.  If you would like to schedule any of these test, please call [phone] or send a message via [email or web address].        If you are currently taking medication, please bring it with you to your appointment for review.           Thank you for letting us care for you,        Matteo Torre MD and your [facility] Team

## 2022-11-03 NOTE — PROGRESS NOTES
2022 Care Everywhere updates requested and reviewed.  Immunizations reconciled. Media reports reviewed.  Duplicate HM overrides and  orders removed.  Overdue HM topic chart audit and/or requested.  Overdue lab testing linked to upcoming lab appointments if applies.  Lab tino, and [employer] reviewed    Lab testing       Health Maintenance Due   Topic Date Due    Hepatitis C Screening  Never done    Pneumococcal Vaccines (Age 0-64) (1 - PCV) Never done    Eye Exam  Never done    HIV Screening  Never done    TETANUS VACCINE  Never done    Low Dose Statin  Never done    Colorectal Cancer Screening  Never done    Shingles Vaccine (1 of 2) Never done    Hemoglobin A1c  2021    COVID-19 Vaccine (3 - Booster for Pfizer series) 11/10/2021    Influenza Vaccine (1) 2022

## 2022-11-18 ENCOUNTER — PATIENT OUTREACH (OUTPATIENT)
Dept: ADMINISTRATIVE | Facility: HOSPITAL | Age: 52
End: 2022-11-18
Payer: COMMERCIAL

## 2022-11-18 NOTE — PROGRESS NOTES
Pt informed of overdue  topics    Pt states he is going on vacation next week, and will try to get some things scheduled upon his return

## 2023-03-13 ENCOUNTER — PATIENT OUTREACH (OUTPATIENT)
Dept: ADMINISTRATIVE | Facility: HOSPITAL | Age: 53
End: 2023-03-13
Payer: COMMERCIAL

## 2023-03-13 NOTE — LETTER
March 13, 2023    Rashid Del Valle  [address] [place] [address]             [facility]  [address]  [facility] [address]  Phone: [phone] Dear Mr. Del Valle, [facility] is committed to your overall health. To help you get the most out of each of your visits, we will review your information to make sure you are up to date on all of your recommended tests and/or procedures.      Currently, we have Matteo Torre MD  listed as your primary care provider.  You have not seen Matteo Torre MD in the office since May 21, 2021.  If Matteo Torre MD is no longer your primary care provider, please contact me so that we may update our records accordingly.      Matteo Torre MD has found that your chart shows you may be due for     Health Maintenance Due   Topic    Hepatitis C Screening     Pneumococcal Vaccines (Age 0-64) (1 - PCV)    Eye Exam     HIV Screening     TETANUS VACCINE     Colorectal Cancer Screening     Shingles Vaccine (1 of 2)    Hemoglobin A1c     COVID-19 Vaccine (3 - Booster for Pfizer series)    Influenza Vaccine (1)      If you have had any of the above done at another facility, please bring the records or information to your appointment so that your record at [facility] will be complete.     Please schedule an appointment with Matteo Torre MD at your earliest convenience by calling [phone] or going to [email or web address].    We appreciate the opportunity to provide you with excellent medical care.    Matteo Torre MD and your [facility] Team

## 2023-03-13 NOTE — PROGRESS NOTES
Non-compliant report chart audits HGBA1C.        Care Everywhere and media, updates requested and reviewed.      [facility] reviewed for tests needed.    Outreach to patient    Left message to return call to clinic    Letter out      Outreach:  HGBA1C

## 2023-03-22 ENCOUNTER — PATIENT OUTREACH (OUTPATIENT)
Dept: ADMINISTRATIVE | Facility: HOSPITAL | Age: 53
End: 2023-03-22
Payer: COMMERCIAL

## 2023-03-22 NOTE — LETTER
March 22, 2023    Rashid Del Valle  [address] [place] [address]             [facility]  [address]  [facility] [address]  Phone: [phone] Dear  [place], [facility] is committed to your overall health. To help you get the most out of each of your visits, we will review your information to make sure you are up to date on all of your recommended tests and/or procedures.      Currently, we have Matteo Torre MD  listed as your primary care provider.  You have not seen Matteo Torre MD in the office since May 21, 2021.  If Matteo Torre MD is no longer your primary care provider, please contact me so that we may update our records accordingly.      Matteo Torre MD has found that your chart shows you may be due for      Hepatitis C Screening     Pneumococcal Vaccines (Age 0-64) (1 - PCV)    Eye Exam     HIV Screening     TETANUS VACCINE     Colorectal Cancer Screening     Shingles Vaccine (1 of 2)    Hemoglobin A1c     COVID-19 Vaccine (3 - Booster for Pfizer series)    Influenza Vaccine (1)        If you have had any of the above done at another facility, please bring the records or information to your appointment so that your record at [facility] will be complete.     Please schedule an appointment with Matteo Torre MD at your earliest convenience by calling [phone] or going to [email or web address].    We appreciate the opportunity to provide you with excellent medical care.    Matteo Torre MD and your [facility] Team

## 2023-03-24 ENCOUNTER — TELEPHONE (OUTPATIENT)
Dept: FAMILY MEDICINE | Facility: CLINIC | Age: 53
End: 2023-03-24
Payer: COMMERCIAL

## 2023-03-24 ENCOUNTER — LAB VISIT (OUTPATIENT)
Dept: LAB | Facility: HOSPITAL | Age: 53
End: 2023-03-24
Payer: COMMERCIAL

## 2023-03-24 DIAGNOSIS — E78.2 MIXED HYPERLIPIDEMIA: ICD-10-CM

## 2023-03-24 DIAGNOSIS — E11.29 TYPE 2 DIABETES MELLITUS WITH MICROALBUMINURIA, WITHOUT LONG-TERM CURRENT USE OF INSULIN: ICD-10-CM

## 2023-03-24 DIAGNOSIS — E66.9 OBESITY (BMI 30.0-34.9): ICD-10-CM

## 2023-03-24 DIAGNOSIS — R80.9 TYPE 2 DIABETES MELLITUS WITH MICROALBUMINURIA, WITHOUT LONG-TERM CURRENT USE OF INSULIN: ICD-10-CM

## 2023-03-24 DIAGNOSIS — E03.9 ACQUIRED HYPOTHYROIDISM: ICD-10-CM

## 2023-03-24 DIAGNOSIS — Z00.00 ROUTINE GENERAL MEDICAL EXAMINATION AT A HEALTH CARE FACILITY: ICD-10-CM

## 2023-03-24 DIAGNOSIS — F41.1 GENERALIZED ANXIETY DISORDER: ICD-10-CM

## 2023-03-24 PROCEDURE — 85025 COMPLETE CBC W/AUTO DIFF WBC: CPT | Performed by: PHYSICIAN ASSISTANT

## 2023-03-24 PROCEDURE — 83036 HEMOGLOBIN GLYCOSYLATED A1C: CPT | Performed by: PHYSICIAN ASSISTANT

## 2023-03-24 PROCEDURE — 36415 COLL VENOUS BLD VENIPUNCTURE: CPT | Mod: PN | Performed by: INTERNAL MEDICINE

## 2023-03-24 NOTE — TELEPHONE ENCOUNTER
----- Message from Ronnie Neri sent at 3/24/2023 12:17 PM CDT -----  Contact: self  Type: Needs Medical Advice  Who Called:  Patient    Best Call Back Number: [phone]    Additional Information: Pt state he would like to get lab work done today and need orders put in. Please call back

## 2023-03-24 NOTE — TELEPHONE ENCOUNTER
----- Message from Lamine Hernandez sent at 3/24/2023 11:25 AM CDT -----  Regarding: ret call  Contact: JESSICA SANTANA [[id number]]  Type:  Patient Returning Call    Who Called:  Jessica    Who Left Message for Patient:  Sudhir    Does the patient know what this is regarding?:  yes    Best Call Back Number:  [phone] (work)    Additional Information:  na

## 2023-03-24 NOTE — TELEPHONE ENCOUNTER
Pt scheduled for labs and appt  Pt will see external eye dr for diabetic eye exam and let us know who he choose  Pt declined colonoscopy, but will think about home tests and let Dr Torre know

## 2023-03-25 LAB
BASOPHILS # BLD AUTO: 0.05 K/UL (ref 0–0.2)
BASOPHILS NFR BLD: 0.5 % (ref 0–1.9)
DIFFERENTIAL METHOD: ABNORMAL
EOSINOPHIL # BLD AUTO: 0.3 K/UL (ref 0–0.5)
EOSINOPHIL NFR BLD: 2.5 % (ref 0–8)
ERYTHROCYTE [DISTWIDTH] IN BLOOD BY AUTOMATED COUNT: 12.9 % (ref 11.5–14.5)
ESTIMATED AVG GLUCOSE: 163 MG/DL (ref 68–131)
HBA1C MFR BLD: 7.3 % (ref 4–5.6)
HCT VFR BLD AUTO: 41.7 % (ref 40–54)
HGB BLD-MCNC: 13.6 G/DL (ref 14–18)
IMM GRANULOCYTES # BLD AUTO: 0.03 K/UL (ref 0–0.04)
IMM GRANULOCYTES NFR BLD AUTO: 0.3 % (ref 0–0.5)
LYMPHOCYTES # BLD AUTO: 2.8 K/UL (ref 1–4.8)
LYMPHOCYTES NFR BLD: 28.5 % (ref 18–48)
MCH RBC QN AUTO: 28.8 PG (ref 27–31)
MCHC RBC AUTO-ENTMCNC: 32.6 G/DL (ref 32–36)
MCV RBC AUTO: 88 FL (ref 82–98)
MONOCYTES # BLD AUTO: 0.8 K/UL (ref 0.3–1)
MONOCYTES NFR BLD: 8 % (ref 4–15)
NEUTROPHILS # BLD AUTO: 5.9 K/UL (ref 1.8–7.7)
NEUTROPHILS NFR BLD: 60.2 % (ref 38–73)
NRBC BLD-RTO: 0 /100 WBC
PLATELET # BLD AUTO: 311 K/UL (ref 150–450)
PMV BLD AUTO: 10.5 FL (ref 9.2–12.9)
RBC # BLD AUTO: 4.73 M/UL (ref 4.6–6.2)
WBC # BLD AUTO: 9.82 K/UL (ref 3.9–12.7)

## 2023-03-30 ENCOUNTER — LAB VISIT (OUTPATIENT)
Dept: LAB | Facility: HOSPITAL | Age: 53
End: 2023-03-30
Attending: INTERNAL MEDICINE
Payer: COMMERCIAL

## 2023-03-30 ENCOUNTER — OFFICE VISIT (OUTPATIENT)
Dept: FAMILY MEDICINE | Facility: CLINIC | Age: 53
End: 2023-03-30
Payer: COMMERCIAL

## 2023-03-30 VITALS
BODY MASS INDEX: 31.22 KG/M2 | HEART RATE: 73 BPM | OXYGEN SATURATION: 98 % | WEIGHT: 223 LBS | DIASTOLIC BLOOD PRESSURE: 90 MMHG | HEIGHT: 71 IN | SYSTOLIC BLOOD PRESSURE: 140 MMHG

## 2023-03-30 DIAGNOSIS — R73.9 HYPERGLYCEMIA: ICD-10-CM

## 2023-03-30 DIAGNOSIS — E66.9 OBESITY (BMI 30.0-34.9): ICD-10-CM

## 2023-03-30 DIAGNOSIS — E78.2 MIXED HYPERLIPIDEMIA: ICD-10-CM

## 2023-03-30 DIAGNOSIS — F41.1 GENERALIZED ANXIETY DISORDER: ICD-10-CM

## 2023-03-30 DIAGNOSIS — E03.9 ACQUIRED HYPOTHYROIDISM: ICD-10-CM

## 2023-03-30 DIAGNOSIS — F31.70 BIPOLAR DISORDER IN PARTIAL REMISSION, MOST RECENT EPISODE UNSPECIFIED TYPE: ICD-10-CM

## 2023-03-30 DIAGNOSIS — E11.9 TYPE 2 DIABETES MELLITUS WITHOUT COMPLICATION, WITHOUT LONG-TERM CURRENT USE OF INSULIN: ICD-10-CM

## 2023-03-30 DIAGNOSIS — Z01.89 ENCOUNTER FOR LABORATORY TEST: ICD-10-CM

## 2023-03-30 DIAGNOSIS — Z11.59 ENCOUNTER FOR HCV SCREENING TEST FOR LOW RISK PATIENT: ICD-10-CM

## 2023-03-30 DIAGNOSIS — I10 PRIMARY HYPERTENSION: Primary | ICD-10-CM

## 2023-03-30 LAB — GLUCOSE SERPL-MCNC: 122 MG/DL (ref 70–110)

## 2023-03-30 PROCEDURE — 99215 PR OFFICE/OUTPT VISIT, EST, LEVL V, 40-54 MIN: ICD-10-PCS | Mod: S$GLB,,, | Performed by: INTERNAL MEDICINE

## 2023-03-30 PROCEDURE — 3008F BODY MASS INDEX DOCD: CPT | Mod: CPTII,S$GLB,, | Performed by: INTERNAL MEDICINE

## 2023-03-30 PROCEDURE — 82962 POCT GLUCOSE, HAND-HELD DEVICE: ICD-10-PCS | Mod: S$GLB,,, | Performed by: INTERNAL MEDICINE

## 2023-03-30 PROCEDURE — 3051F PR MOST RECENT HEMOGLOBIN A1C LEVEL 7.0 - < 8.0%: ICD-10-PCS | Mod: CPTII,S$GLB,, | Performed by: INTERNAL MEDICINE

## 2023-03-30 PROCEDURE — 82962 GLUCOSE BLOOD TEST: CPT | Mod: S$GLB,,, | Performed by: INTERNAL MEDICINE

## 2023-03-30 PROCEDURE — 3080F DIAST BP >= 90 MM HG: CPT | Mod: CPTII,S$GLB,, | Performed by: INTERNAL MEDICINE

## 2023-03-30 PROCEDURE — 1160F RVW MEDS BY RX/DR IN RCRD: CPT | Mod: CPTII,S$GLB,, | Performed by: INTERNAL MEDICINE

## 2023-03-30 PROCEDURE — 99999 PR PBB SHADOW E&M-EST. PATIENT-LVL IV: CPT | Mod: PBBFAC,,, | Performed by: INTERNAL MEDICINE

## 2023-03-30 PROCEDURE — 3077F SYST BP >= 140 MM HG: CPT | Mod: CPTII,S$GLB,, | Performed by: INTERNAL MEDICINE

## 2023-03-30 PROCEDURE — 1160F PR REVIEW ALL MEDS BY PRESCRIBER/CLIN PHARMACIST DOCUMENTED: ICD-10-PCS | Mod: CPTII,S$GLB,, | Performed by: INTERNAL MEDICINE

## 2023-03-30 PROCEDURE — 86803 HEPATITIS C AB TEST: CPT | Performed by: INTERNAL MEDICINE

## 2023-03-30 PROCEDURE — 99215 OFFICE O/P EST HI 40 MIN: CPT | Mod: S$GLB,,, | Performed by: INTERNAL MEDICINE

## 2023-03-30 PROCEDURE — 3077F PR MOST RECENT SYSTOLIC BLOOD PRESSURE >= 140 MM HG: ICD-10-PCS | Mod: CPTII,S$GLB,, | Performed by: INTERNAL MEDICINE

## 2023-03-30 PROCEDURE — 80061 LIPID PANEL: CPT | Performed by: INTERNAL MEDICINE

## 2023-03-30 PROCEDURE — 84439 ASSAY OF FREE THYROXINE: CPT | Performed by: INTERNAL MEDICINE

## 2023-03-30 PROCEDURE — 80053 COMPREHEN METABOLIC PANEL: CPT | Performed by: INTERNAL MEDICINE

## 2023-03-30 PROCEDURE — 3008F PR BODY MASS INDEX (BMI) DOCUMENTED: ICD-10-PCS | Mod: CPTII,S$GLB,, | Performed by: INTERNAL MEDICINE

## 2023-03-30 PROCEDURE — 3080F PR MOST RECENT DIASTOLIC BLOOD PRESSURE >= 90 MM HG: ICD-10-PCS | Mod: CPTII,S$GLB,, | Performed by: INTERNAL MEDICINE

## 2023-03-30 PROCEDURE — 83690 ASSAY OF LIPASE: CPT | Performed by: INTERNAL MEDICINE

## 2023-03-30 PROCEDURE — 36415 COLL VENOUS BLD VENIPUNCTURE: CPT | Mod: PN | Performed by: INTERNAL MEDICINE

## 2023-03-30 PROCEDURE — 99999 PR PBB SHADOW E&M-EST. PATIENT-LVL IV: ICD-10-PCS | Mod: PBBFAC,,, | Performed by: INTERNAL MEDICINE

## 2023-03-30 PROCEDURE — 84443 ASSAY THYROID STIM HORMONE: CPT | Performed by: INTERNAL MEDICINE

## 2023-03-30 PROCEDURE — 1159F MED LIST DOCD IN RCRD: CPT | Mod: CPTII,S$GLB,, | Performed by: INTERNAL MEDICINE

## 2023-03-30 PROCEDURE — 1159F PR MEDICATION LIST DOCUMENTED IN MEDICAL RECORD: ICD-10-PCS | Mod: CPTII,S$GLB,, | Performed by: INTERNAL MEDICINE

## 2023-03-30 PROCEDURE — 3051F HG A1C>EQUAL 7.0%<8.0%: CPT | Mod: CPTII,S$GLB,, | Performed by: INTERNAL MEDICINE

## 2023-03-30 RX ORDER — SEMAGLUTIDE 1.34 MG/ML
INJECTION, SOLUTION SUBCUTANEOUS
Qty: 3 ML | Refills: 1 | Status: SHIPPED | OUTPATIENT
Start: 2023-03-30

## 2023-03-30 RX ORDER — NAPROXEN SODIUM 220 MG/1
81 TABLET, FILM COATED ORAL DAILY
COMMUNITY

## 2023-03-30 RX ORDER — ATORVASTATIN CALCIUM 10 MG/1
10 TABLET, FILM COATED ORAL DAILY
Qty: 30 TABLET | Refills: 3 | Status: SHIPPED | OUTPATIENT
Start: 2023-03-30 | End: 2024-03-29

## 2023-03-30 RX ORDER — CARIPRAZINE 3 MG/1
3 CAPSULE, GELATIN COATED ORAL
COMMUNITY
Start: 2023-02-19

## 2023-03-30 NOTE — PROGRESS NOTES
Subjective:       Patient ID: Rashid Del Valle is a 52 y.o. male.    Chief Complaint: Follow-up (Blood test results)  HPI:  Patient here today for reassessment and go over lab work   Follow-up  Pertinent negatives include no abdominal pain, arthralgias, chest pain, congestion, coughing, headaches, myalgias, nausea, rash or vomiting.   Primary hypertension: Maintain < 2 Gm Na a day diet, and monitor BP at home; keep a log. Cont present tx.  Manual blood pressure today 140/90 with pulse 73.  Stressed the importance of monitoring his blood pressure at home and keeping a log for office review.  Watch his salt intake a lot closer and exercise regularly    Generalized anxiety disorder: doing a lot better w Dr Abarca as his psych; meds reviewed.     Bipolar disorder in partial remission, most recent episode unspecified; type type II    Mixed hyperlipidemia; ,Maintain low fat high fiber diet, exercise regularly. Weight reduction where indicated.  Will start atorvastatin 10 mg daily: Lipid profile:  193 cholesterol down from 264; 212 triglyceride down from 331; HDL 38 down from 41; .6 down from 156.8.  Patient has been exercising more and stopped sweet tea    The 10-year ASCVD risk score (Ayaka CHARLES, et al., 2019) is: 14.6%    Values used to calculate the score:      Age: 52 years      Sex: Male      Is Non- : No      Diabetic: Yes      Tobacco smoker: No      Systolic Blood Pressure: 140 mmHg      Is BP treated: Yes      HDL Cholesterol: 38 mg/dL      Total Cholesterol: 193 mg/dL    Type 2 diabetes mellitus without complication, without long-term current use of insulin; Maintain a low carb diet; monitor CBG's at home; keep a log for review. Off Januvia; will start Ozempic at 0.25 mg weekly. A1C at 7.3 down from 9.3.  Blood sugars have been running 150-200.  Needs to watch his diet closer and include regular exercise as part of his daily routine.  Continue metformin a 1000 mg b.i.d. with meals  in glimepiride 2 mg every morning with breakfast.  Will start Ozempic at 0.25 mg weekly for 4 weeks then if tolerated well will increase to 0.5 mg subQ weekly.  To call for any problems or issues with the medication.  Patient in obesity stage I with BMI 31.10; fasting blood sugar 123.  -     POCT Glucose, Hand-Held Device  -     semaglutide (OZEMPIC) 0.25 mg or 0.5 mg(2 mg/1.5 mL) pen injector; Start at 0.25 mg sq weekly for 4 weeks; if tolerated after 4 weeks, can increase to 0.5 mg weekly.  Dispense: 3 mL; Refill: 1  -     Comprehensive Metabolic Panel; Future; Ex    Hyperglycemia: add Ozempic at 0.25 mg weekly.  See above.  Adhere to his diet a lot better and exercise more regularly  -     POCT Glucose, Hand-Held Device  -     semaglutide (OZEMPIC) 0.25 mg or 0.5 mg(2 mg/1.5 mL) pen injector; Start at 0.25 mg sq weekly for 4 weeks; if tolerated after 4 weeks, can increase to 0.5 mg weekly.  Dispense: 3 mL; Refill: 1  -     Comprehensive Metabolic Panel; Future; Expected date: 03/30/2023  -     Lipase; Future; Expected date: 03/30/2023    Acquired hypothyroidism; TFT on f/u. Same levothyroxine at 88 mcg daily before breakfast.    Obesity (BMI 30.0-34.9); Caloric restriction w regular exercise and weight reduction.  BMI 31.10; has gained 3 lb since 08/11.  Claims to be exercising more and stop sweet tea which has helped his A1c come down  -     Comprehensive Metabolic Panel; Future; Expected date: 03/30/2023  -     Lipase; Future; Expected date: 03/30/2023    Encounter for HCV screening test for low risk patient  -     T4, Free; Future; Expected date: 03/30/2023  -     TSH; Future; Expected date: 03/30/2023  -     Hepatitis C Antibody; Future; Expected date: 03/30/2023    Encounter for lab test: Labs reviewed and discussed with patient at length in ordered for follow-up    Total time 8:15 a.m. to 9:09 a.m..  Greater than 50% of the time spent in discussion counseling and review.  Labs reviewed discussed in ordered  "for follow-up.  Medications reviewed and addressed.  Various different topics discussed including plan of care    Review of Systems   Constitutional:  Negative for appetite change and unexpected weight change.   HENT:  Negative for congestion, postnasal drip, rhinorrhea and sinus pressure.         Denies seasonal allergies, or perennial allergies   Eyes:  Negative for discharge and itching.   Respiratory:  Negative for cough, chest tightness, shortness of breath and wheezing.    Cardiovascular:  Negative for chest pain, palpitations and leg swelling.   Gastrointestinal:  Negative for abdominal pain, blood in stool, constipation, diarrhea, nausea and vomiting.   Endocrine: Negative for polydipsia, polyphagia and polyuria.   Genitourinary:  Negative for dysuria and hematuria.   Musculoskeletal:  Negative for arthralgias and myalgias.   Skin:  Negative for rash.   Allergic/Immunologic: Negative for environmental allergies and food allergies.   Neurological:  Negative for tremors, seizures and headaches.   Hematological:  Negative for adenopathy. Does not bruise/bleed easily.   Psychiatric/Behavioral:  Positive for dysphoric mood and sleep disturbance. The patient is nervous/anxious.         Meds help; sees Dr Abarca.     Objective:        Vitals:    03/30/23 0803   BP: (!) 140/90   Pulse: 73   SpO2: 98%   Weight: 101.1 kg (222 lb 15.9 oz)   Height: 5' 11" (1.803 m)       BMI Readings from Last 3 Encounters:   03/30/23 31.10 kg/m²   08/18/22 30.64 kg/m²   01/20/22 30.75 kg/m²        Wt Readings from Last 3 Encounters:   03/30/23 0803 101.1 kg (222 lb 15.9 oz)   08/18/22 1107 99.6 kg (219 lb 11 oz)   01/20/22 1626 100 kg (220 lb 7.4 oz)        BP Readings from Last 3 Encounters:   03/30/23 (!) 140/90   08/18/22 (!) 142/102   01/20/22 135/75        There are no preventive care reminders to display for this patient.     Health Maintenance Due   Topic Date Due    Pneumococcal Vaccines (Age 0-64) (1 - PCV) Never done    Eye " Exam  Never done    HIV Screening  Never done    TETANUS VACCINE  Never done    Colorectal Cancer Screening  Never done    Shingles Vaccine (1 of 2) Never done    COVID-19 Vaccine (3 - Booster for Pfizer series) 11/10/2021    Influenza Vaccine (1) 2022         Past Medical History:   Diagnosis Date    Depression     Diabetes mellitus, type 2     Hyperlipidemia     Hypertension     Obesity (BMI 30.0-34.9)     Thyroid disease        Past Surgical History:   Procedure Laterality Date    ANKLE SURGERY Right     fx w pins neeeded; removed yrs later.       Social History     Tobacco Use    Smoking status: Former     Years: 10.00     Types: Cigarettes     Quit date: 3/20/2005     Years since quittin.0    Smokeless tobacco: Never    Tobacco comments:     2 cigarettes per day.   Substance Use Topics    Alcohol use: No    Drug use: No       Family History   Problem Relation Age of Onset    Depression Mother     No Known Problems Father     Diabetes Paternal Grandfather     Depression Sister     Cancer Maternal Grandfather         skin cancer.    Diabetes Maternal Grandfather     Heart disease Maternal Grandfather     Depression Maternal Uncle     Depression Maternal Uncle        Review of patient's allergies indicates:  No Known Allergies    Current Outpatient Medications on File Prior to Visit   Medication Sig Dispense Refill    acetaminophen (TYLENOL) 325 MG tablet Take 2 tablets (650 mg total) by mouth every 6 (six) hours as needed.  0    aspirin 81 MG Chew Take 81 mg by mouth once daily.      blood sugar diagnostic Strp 1 strip by Misc.(Non-Drug; Combo Route) route 2 (two) times daily. As per his insurance and glucose meter. To test blood sugar levels 100 strip 1    clonazePAM (KLONOPIN) 1 MG tablet Take 1 mg by mouth 3 (three) times daily.       divalproex (DEPAKOTE) 500 MG TbEC Take 500 mg by mouth 2 (two) times daily.  3    glimepiride (AMARYL) 2 MG tablet TAKE 1 TABLET(2 MG) BY MOUTH DAILY WITH BREAKFAST 45  tablet 0    hydrocortisone 2.5 % cream Apply 1 application topically once daily.  0    hydrOXYzine (ATARAX) 50 MG tablet TK 1 T PO Q 6 H PRN  2    hydrOXYzine pamoate (VISTARIL) 50 MG Cap Take 50 mg by mouth nightly as needed.  3    ketoconazole (NIZORAL) 2 % cream Apply 1 application topically once daily.  6    ketoconazole (NIZORAL) 2 % shampoo Apply 1 application topically once daily.  10    levothyroxine (SYNTHROID) 88 MCG tablet TAKE 1 TABLET(88 MCG) BY MOUTH BEFORE BREAKFAST 50 tablet 0    metFORMIN (GLUCOPHAGE) 1000 MG tablet Take 1 tablet (1,000 mg total) by mouth 2 (two) times daily with meals. 90 tablet 0    metoprolol succinate (TOPROL-XL) 25 MG 24 hr tablet TAKE 1 TABLET(25 MG) BY MOUTH EVERY MORNING 45 tablet 0    mirtazapine (REMERON) 30 MG tablet Take 30 mg by mouth every evening.      paroxetine (PAXIL) 40 MG tablet Take 40 mg by mouth once daily.       QUEtiapine (SEROQUEL) 50 MG tablet Take 50 mg by mouth nightly.      VRAYLAR 3 mg Cap Take 3 mg by mouth.      citalopram (CELEXA) 40 MG tablet Take 40 mg by mouth once daily.      meloxicam (MOBIC) 15 MG tablet TAKE 1 TABLET BY MOUTH DAILY AS NEEDED FOR PAIN (Patient not taking: Reported on 3/30/2023) 30 tablet 2     No current facility-administered medications on file prior to visit.       Physical Exam  Vitals reviewed.   Constitutional:       Appearance: He is well-developed.   HENT:      Head: Normocephalic and atraumatic.   Neck:      Thyroid: No thyromegaly.   Cardiovascular:      Rate and Rhythm: Normal rate and regular rhythm.      Heart sounds: Normal heart sounds. No murmur heard.    No gallop.   Pulmonary:      Effort: Pulmonary effort is normal. No respiratory distress.      Breath sounds: Normal breath sounds. No wheezing or rales.   Abdominal:      General: Bowel sounds are normal. There is no distension.      Palpations: Abdomen is soft.      Tenderness: There is no abdominal tenderness. There is no guarding or rebound.    Musculoskeletal:         General: Normal range of motion.      Cervical back: Normal range of motion and neck supple.      Right lower leg: No edema.      Left lower leg: No edema.   Lymphadenopathy:      Cervical: No cervical adenopathy.   Skin:     Findings: No rash.   Neurological:      Mental Status: He is alert and oriented to person, place, and time.      Comments: Moves all 4 extremities fine.   Psychiatric:         Behavior: Behavior normal.         Thought Content: Thought content normal.       Assessment:       1. Primary hypertension    2. Generalized anxiety disorder    3. Bipolar disorder in partial remission, most recent episode unspecified type    4. Mixed hyperlipidemia    5. Type 2 diabetes mellitus without complication, without long-term current use of insulin    6. Hyperglycemia    7. Acquired hypothyroidism    8. Obesity (BMI 30.0-34.9)    9. Encounter for HCV screening test for low risk patient    10. Encounter for laboratory test        Plan:       Primary hypertension: Maintain < 2 Gm Na a day diet, and monitor BP at home; keep a log. Cont present tx.  Manual blood pressure today 140/90 with pulse 73.  Stressed the importance of monitoring his blood pressure at home and keeping a log for office review.  Watch his salt intake a lot closer and exercise regularly    Generalized anxiety disorder: doing a lot better w Dr Abarca as his psych; meds reviewed.     Bipolar disorder in partial remission, most recent episode unspecified; type type II    Mixed hyperlipidemia; ,Maintain low fat high fiber diet, exercise regularly. Weight reduction where indicated.  Will start atorvastatin 10 mg daily: Lipid profile:  193 cholesterol down from 264; 212 triglyceride down from 331; HDL 38 down from 41; .6 down from 156.8.  Patient has been exercising more and stopped sweet tea    Type 2 diabetes mellitus without complication, without long-term current use of insulin; Maintain a low carb diet; monitor  CBG's at home; keep a log for review. Off Januvia; will start Ozempic at 0.25 mg weekly. A1C at 7.3 down from 9.3.  Blood sugars have been running 150-200.  Needs to watch his diet closer and include regular exercise as part of his daily routine.  Continue metformin a 1000 mg b.i.d. with meals in glimepiride 2 mg every morning with breakfast.  Will start Ozempic at 0.25 mg weekly for 4 weeks then if tolerated well will increase to 0.5 mg subQ weekly.  To call for any problems or issues with the medication.  Patient in obesity stage I with BMI 31.10; fasting blood sugar 123.  -     POCT Glucose, Hand-Held Device  -     semaglutide (OZEMPIC) 0.25 mg or 0.5 mg(2 mg/1.5 mL) pen injector; Start at 0.25 mg sq weekly for 4 weeks; if tolerated after 4 weeks, can increase to 0.5 mg weekly.  Dispense: 3 mL; Refill: 1  -     Comprehensive Metabolic Panel; Future; Ex    Hyperglycemia: add Ozempic at 0.25 mg weekly.  See above.  Adhere to his diet a lot better and exercise more regularly  -     POCT Glucose, Hand-Held Device  -     semaglutide (OZEMPIC) 0.25 mg or 0.5 mg(2 mg/1.5 mL) pen injector; Start at 0.25 mg sq weekly for 4 weeks; if tolerated after 4 weeks, can increase to 0.5 mg weekly.  Dispense: 3 mL; Refill: 1  -     Comprehensive Metabolic Panel; Future; Expected date: 03/30/2023  -     Lipase; Future; Expected date: 03/30/2023    Acquired hypothyroidism; TFT on f/u. Same levothyroxine at 88 mcg daily before breakfast.    Obesity (BMI 30.0-34.9); Caloric restriction w regular exercise and weight reduction.  BMI 31.10; has gained 3 lb since 08/11.  Claims to be exercising more and stop sweet tea which has helped his A1c come down  -     Comprehensive Metabolic Panel; Future; Expected date: 03/30/2023  -     Lipase; Future; Expected date: 03/30/2023    Encounter for HCV screening test for low risk patient  -     T4, Free; Future; Expected date: 03/30/2023  -     TSH; Future; Expected date: 03/30/2023  -     Hepatitis C  Antibody; Future; Expected date: 03/30/2023    Encounter for lab test: Labs reviewed and discussed with patient at length and ordered for today to be performed

## 2023-03-30 NOTE — PATIENT INSTRUCTIONS
Primary hypertension: Maintain < 2 Gm Na a day diet, and monitor BP at home; keep a log. Cont present tx.     Generalized anxiety disorder: doing a lot better w dr Abarca as his psych; meds reviewed.     Bipolar disorder in partial remission, most recent episode unspecified; type type II    Mixed hyperlipidemia; ,Maintain low fat high fiber diet, exercise regularly. Weight reduction where indicated.     Type 2 diabetes mellitus without complication, without long-term current use of insulin; Maintain a low carb diet; monitor CBG's at home; keep a log for review. Off Januvia; will start Ozempic at 0.25 mg weekly. A1C at 7.3 down from 9.3.   -     POCT Glucose, Hand-Held Device  -     semaglutide (OZEMPIC) 0.25 mg or 0.5 mg(2 mg/1.5 mL) pen injector; Start at 0.25 mg sq weekly for 4 weeks; if tolerated after 4 weeks, can increase to 0.5 mg weekly.  Dispense: 3 mL; Refill: 1  -     Comprehensive Metabolic Panel; Future; Ex    Hyperglycemia: add Ozempic at 0.25 mg weekly.   -     POCT Glucose, Hand-Held Device  -     semaglutide (OZEMPIC) 0.25 mg or 0.5 mg(2 mg/1.5 mL) pen injector; Start at 0.25 mg sq weekly for 4 weeks; if tolerated after 4 weeks, can increase to 0.5 mg weekly.  Dispense: 3 mL; Refill: 1  -     Comprehensive Metabolic Panel; Future; Expected date: 03/30/2023  -     Lipase; Future; Expected date: 03/30/2023    Acquired hypothyroidism; TFT on f/u. Same levothyroxine.    Obesity (BMI 30.0-34.9); Caloric restriction w regular exercise and weight reduction.   -     Comprehensive Metabolic Panel; Future; Expected date: 03/30/2023  -     Lipase; Future; Expected date: 03/30/2023    Encounter for HCV screening test for low risk patient  -     T4, Free; Future; Expected date: 03/30/2023  -     TSH; Future; Expected date: 03/30/2023  -     Hepatitis C Antibody; Future; Expected date: 03/30/2023

## 2023-03-31 LAB
ALBUMIN SERPL BCP-MCNC: 4 G/DL (ref 3.5–5.2)
ALP SERPL-CCNC: 65 U/L (ref 55–135)
ALT SERPL W/O P-5'-P-CCNC: 16 U/L (ref 10–44)
ANION GAP SERPL CALC-SCNC: 12 MMOL/L (ref 8–16)
AST SERPL-CCNC: 12 U/L (ref 10–40)
BILIRUB SERPL-MCNC: 0.3 MG/DL (ref 0.1–1)
BUN SERPL-MCNC: 13 MG/DL (ref 6–20)
CALCIUM SERPL-MCNC: 9.5 MG/DL (ref 8.7–10.5)
CHLORIDE SERPL-SCNC: 99 MMOL/L (ref 95–110)
CHOLEST SERPL-MCNC: 193 MG/DL (ref 120–199)
CHOLEST/HDLC SERPL: 5.1 {RATIO} (ref 2–5)
CO2 SERPL-SCNC: 29 MMOL/L (ref 23–29)
CREAT SERPL-MCNC: 0.9 MG/DL (ref 0.5–1.4)
EST. GFR  (NO RACE VARIABLE): >60 ML/MIN/1.73 M^2
GLUCOSE SERPL-MCNC: 123 MG/DL (ref 70–110)
HCV AB SERPL QL IA: NORMAL
HDLC SERPL-MCNC: 38 MG/DL (ref 40–75)
HDLC SERPL: 19.7 % (ref 20–50)
LDLC SERPL CALC-MCNC: 112.6 MG/DL (ref 63–159)
LIPASE SERPL-CCNC: 17 U/L (ref 4–60)
NONHDLC SERPL-MCNC: 155 MG/DL
POTASSIUM SERPL-SCNC: 4.9 MMOL/L (ref 3.5–5.1)
PROT SERPL-MCNC: 7.5 G/DL (ref 6–8.4)
SODIUM SERPL-SCNC: 140 MMOL/L (ref 136–145)
T4 FREE SERPL-MCNC: 0.94 NG/DL (ref 0.71–1.51)
TRIGL SERPL-MCNC: 212 MG/DL (ref 30–150)
TSH SERPL DL<=0.005 MIU/L-ACNC: 5.21 UIU/ML (ref 0.4–4)

## 2023-04-07 ENCOUNTER — PATIENT MESSAGE (OUTPATIENT)
Dept: ADMINISTRATIVE | Facility: HOSPITAL | Age: 53
End: 2023-04-07
Payer: COMMERCIAL

## 2023-04-11 ENCOUNTER — PATIENT MESSAGE (OUTPATIENT)
Dept: ADMINISTRATIVE | Facility: HOSPITAL | Age: 53
End: 2023-04-11
Payer: COMMERCIAL

## 2023-04-13 ENCOUNTER — TELEPHONE (OUTPATIENT)
Dept: FAMILY MEDICINE | Facility: CLINIC | Age: 53
End: 2023-04-13
Payer: COMMERCIAL

## 2023-04-13 NOTE — TELEPHONE ENCOUNTER
----- Message from Bob Chapa PA-C sent at 4/13/2023 10:23 AM CDT -----  Your lab tests are stable except your HgA1c is still elevated above 7  at 7.this is a good improvement from 9.3 one year ago  Keep up the good work and we need to recheck you in 3 months

## 2023-04-16 ENCOUNTER — TELEPHONE (OUTPATIENT)
Dept: FAMILY MEDICINE | Facility: CLINIC | Age: 53
End: 2023-04-16
Payer: COMMERCIAL

## 2023-04-16 DIAGNOSIS — E11.9 TYPE 2 DIABETES MELLITUS WITHOUT COMPLICATION, WITHOUT LONG-TERM CURRENT USE OF INSULIN: ICD-10-CM

## 2023-04-16 DIAGNOSIS — E78.2 MIXED HYPERLIPIDEMIA: ICD-10-CM

## 2023-04-16 DIAGNOSIS — I10 PRIMARY HYPERTENSION: ICD-10-CM

## 2023-04-16 DIAGNOSIS — Z12.5 PROSTATE CANCER SCREENING: Primary | ICD-10-CM

## 2023-04-16 DIAGNOSIS — D64.9 ANEMIA, UNSPECIFIED TYPE: ICD-10-CM

## 2023-04-16 DIAGNOSIS — Z12.5 PROSTATE CANCER SCREENING: ICD-10-CM

## 2023-04-16 DIAGNOSIS — E03.9 ACQUIRED HYPOTHYROIDISM: Primary | ICD-10-CM

## 2023-04-16 RX ORDER — LEVOTHYROXINE SODIUM 100 UG/1
100 TABLET ORAL
Qty: 90 TABLET | Refills: 1 | Status: SHIPPED | OUTPATIENT
Start: 2023-04-16 | End: 2024-04-15

## 2023-04-16 NOTE — TELEPHONE ENCOUNTER
Please call patient and have him schedule follow-up appointment in 2 months with fasting labs 1 week prior which will be ordered.  Also tell him based on his lab results I would like him to increase his levothyroxine from 88 mcg daily to 100 mcg daily.  This may help him both in his cholesterol level and his weight

## 2023-05-24 ENCOUNTER — TELEPHONE (OUTPATIENT)
Dept: FAMILY MEDICINE | Facility: CLINIC | Age: 53
End: 2023-05-24
Payer: COMMERCIAL

## 2023-05-24 NOTE — TELEPHONE ENCOUNTER
----- Message from Meera Tipton sent at 5/22/2023  2:44 PM CDT -----  Type: Needs Medical Advice  Who Called:  pt   Best Call Back Number: [phone]     Additional Information: requesting a call back , wants to discuss a coupon for rx please advise thank you

## 2023-10-04 ENCOUNTER — PATIENT MESSAGE (OUTPATIENT)
Dept: ADMINISTRATIVE | Facility: HOSPITAL | Age: 53
End: 2023-10-04
Payer: COMMERCIAL

## 2024-04-29 ENCOUNTER — TELEPHONE (OUTPATIENT)
Dept: FAMILY MEDICINE | Facility: CLINIC | Age: 54
End: 2024-04-29
Payer: COMMERCIAL

## 2024-04-29 NOTE — TELEPHONE ENCOUNTER
Called patient with no answer, lvm to return call to reschedule appt on 5/2 due to Dr. Giron not being in the office that day

## 2024-05-01 ENCOUNTER — OFFICE VISIT (OUTPATIENT)
Dept: FAMILY MEDICINE | Facility: CLINIC | Age: 54
End: 2024-05-01
Payer: COMMERCIAL

## 2024-05-01 VITALS
WEIGHT: 214.63 LBS | SYSTOLIC BLOOD PRESSURE: 142 MMHG | DIASTOLIC BLOOD PRESSURE: 112 MMHG | BODY MASS INDEX: 30.05 KG/M2 | RESPIRATION RATE: 16 BRPM | HEIGHT: 71 IN | HEART RATE: 80 BPM

## 2024-05-01 DIAGNOSIS — I10 PRIMARY HYPERTENSION: Primary | ICD-10-CM

## 2024-05-01 DIAGNOSIS — F41.1 GENERALIZED ANXIETY DISORDER: ICD-10-CM

## 2024-05-01 DIAGNOSIS — Z12.11 SCREENING FOR MALIGNANT NEOPLASM OF COLON: ICD-10-CM

## 2024-05-01 DIAGNOSIS — E78.2 MIXED HYPERLIPIDEMIA: ICD-10-CM

## 2024-05-01 DIAGNOSIS — E03.9 ACQUIRED HYPOTHYROIDISM: ICD-10-CM

## 2024-05-01 DIAGNOSIS — E11.9 TYPE 2 DIABETES MELLITUS WITHOUT COMPLICATION, WITHOUT LONG-TERM CURRENT USE OF INSULIN: ICD-10-CM

## 2024-05-01 DIAGNOSIS — Z12.5 SCREENING FOR MALIGNANT NEOPLASM OF PROSTATE: ICD-10-CM

## 2024-05-01 DIAGNOSIS — F31.70 BIPOLAR DISORDER IN PARTIAL REMISSION, MOST RECENT EPISODE UNSPECIFIED TYPE: ICD-10-CM

## 2024-05-01 PROCEDURE — 99214 OFFICE O/P EST MOD 30 MIN: CPT | Mod: S$GLB,,, | Performed by: INTERNAL MEDICINE

## 2024-05-01 PROCEDURE — 99999 PR PBB SHADOW E&M-EST. PATIENT-LVL IV: CPT | Mod: PBBFAC,,, | Performed by: INTERNAL MEDICINE

## 2024-05-01 PROCEDURE — 3080F DIAST BP >= 90 MM HG: CPT | Mod: CPTII,S$GLB,, | Performed by: INTERNAL MEDICINE

## 2024-05-01 PROCEDURE — 1159F MED LIST DOCD IN RCRD: CPT | Mod: CPTII,S$GLB,, | Performed by: INTERNAL MEDICINE

## 2024-05-01 PROCEDURE — 3077F SYST BP >= 140 MM HG: CPT | Mod: CPTII,S$GLB,, | Performed by: INTERNAL MEDICINE

## 2024-05-01 PROCEDURE — 4010F ACE/ARB THERAPY RXD/TAKEN: CPT | Mod: CPTII,S$GLB,, | Performed by: INTERNAL MEDICINE

## 2024-05-01 PROCEDURE — 1160F RVW MEDS BY RX/DR IN RCRD: CPT | Mod: CPTII,S$GLB,, | Performed by: INTERNAL MEDICINE

## 2024-05-01 PROCEDURE — 3008F BODY MASS INDEX DOCD: CPT | Mod: CPTII,S$GLB,, | Performed by: INTERNAL MEDICINE

## 2024-05-01 RX ORDER — OLMESARTAN MEDOXOMIL AND HYDROCHLOROTHIAZIDE 20/12.5 20; 12.5 MG/1; MG/1
1 TABLET ORAL DAILY
Qty: 30 TABLET | Refills: 1 | Status: SHIPPED | OUTPATIENT
Start: 2024-05-01 | End: 2025-05-01

## 2024-05-01 NOTE — PROGRESS NOTES
"Assessment and Plan:    1. Primary hypertension  Uncontrolled and has been off of medications. Start olmesartan/HCTZ. Follow up with home BP log after labs.  - Comprehensive Metabolic Panel; Future  - Lipid Panel; Future  - olmesartan-hydrochlorothiazide (BENICAR HCT) 20-12.5 mg per tablet; Take 1 tablet by mouth once daily.  Dispense: 30 tablet; Refill: 1    2. Type 2 diabetes mellitus without complication, without long-term current use of insulin  Very likely will need treatment. Hesitant about prior meds as he did not think they were working. Does not want to take "the shot." Will obtain labs and discuss options next visit. Asked to keep glucose log (once or twice a day either fasting or 2 hr post-prandial) before this visit.  - Comprehensive Metabolic Panel; Future  - Lipid Panel; Future  - Hemoglobin A1C; Future  - Microalbumin/Creatinine Ratio, Urine; Future    3. Acquired hypothyroidism  Previously on levothyroxine, but off of this for at least a year. Will discuss restarting after labs.  - TSH; Future    4. Mixed hyperlipidemia  Previously on atorvastatin, likely will need to restart this next visit.  - Comprehensive Metabolic Panel; Future  - Lipid Panel; Future    5. Bipolar disorder in partial remission, most recent episode unspecified type  6. Generalized anxiety disorder  Continue follow up with psych doctor.    7. Screening for malignant neoplasm of prostate  - PSA, Screening; Future    8. Screening for malignant neoplasm of colon  - Cologuard Screening (Multitarget Stool DNA); Future  - Cologuard Screening (Multitarget Stool DNA)    ______________________________________________________________________  Subjective:    Chief Complaint:  Establish care    HPI:  Rashid is a 53 y.o. year old man here to establish care. Has not been seen since prior PCP Dr. Torre retired.    DM2- When last seen he was prescribed glimepiride 2 mg daily, metformin 1000 mg BID. Last A1c 7.3. Prior to Dr. Torre leaving he " had prescribed ozempic. Patient filled this once, but had to pay $600 so he did not continue this. Has been off of all medications for diabetes for about a year. Checked a random evening glucose yesterday which was 170. Typically above 200 after eating. FBG has been 150-170 generally.    HTN- Has been off of all BP medication other than taking 1 month of lisinopril 10 from his psychiatrist for about a year. BP at home has been in 160s/110s.     HLD- Prescribed atorvastatin 10 mg daily, but has not been taking this as he was not sure what it was for.    Hypothyroidism- Prescribed levothyroxine 100 mcg daily at some point, but does not recall if he was ever actually taking this. Has certainly not been taking this recently.    Psych- Diagnosed with bipolar II and DESI. Seeing Dr. Ismael Abarca. Currently taking clonazepam 1 mg daily PRN, depakote 500 mg BID, paxil 40 mg daily, seroquel 50 mg nightly PRN.    Past Medical History:  Past Medical History:   Diagnosis Date    Depression     Diabetes mellitus, type 2     Hyperlipidemia     Hypertension     Obesity (BMI 30.0-34.9)     Thyroid disease        Past Surgical History:  Past Surgical History:   Procedure Laterality Date    ANKLE SURGERY Right     fx w pins neeeded; removed yrs later.       Family History:  Family History   Problem Relation Name Age of Onset    Depression Mother      No Known Problems Father      Diabetes Paternal Grandfather      Depression Sister      Cancer Maternal Grandfather          skin cancer.    Diabetes Maternal Grandfather      Heart disease Maternal Grandfather      Depression Maternal Uncle      Depression Maternal Uncle         Social History:  Social History     Socioeconomic History    Marital status:      Spouse name: Sangeeta   Occupational History    Occupation: Dance  and owner.   Tobacco Use    Smoking status: Former     Current packs/day: 0.00     Types: Cigarettes     Start date: 3/20/1995     Quit date:  3/20/2005     Years since quittin.1    Smokeless tobacco: Never    Tobacco comments:     2 cigarettes per day.   Substance and Sexual Activity    Alcohol use: No    Drug use: Yes     Types: Marijuana    Sexual activity: Yes     Partners: Female       Medications:  Current Outpatient Medications on File Prior to Visit   Medication Sig Dispense Refill    clonazePAM (KLONOPIN) 1 MG tablet Take 1 mg by mouth 3 (three) times daily.       paroxetine (PAXIL) 40 MG tablet Take 40 mg by mouth once daily.       QUEtiapine (SEROQUEL) 50 MG tablet Take 50 mg by mouth nightly.      acetaminophen (TYLENOL) 325 MG tablet Take 2 tablets (650 mg total) by mouth every 6 (six) hours as needed. (Patient not taking: Reported on 2024)  0    aspirin 81 MG Chew Take 81 mg by mouth once daily. (Patient not taking: Reported on 2024)      atorvastatin (LIPITOR) 10 MG tablet Take 1 tablet (10 mg total) by mouth once daily. Generic 30 tablet 3    blood sugar diagnostic Strp 1 strip by Misc.(Non-Drug; Combo Route) route 2 (two) times daily. As per his insurance and glucose meter. To test blood sugar levels 100 strip 1    citalopram (CELEXA) 40 MG tablet Take 40 mg by mouth once daily. (Patient not taking: Reported on 2024)      divalproex (DEPAKOTE) 500 MG TbEC Take 500 mg by mouth 2 (two) times daily. (Patient not taking: Reported on 2024)  3    glimepiride (AMARYL) 2 MG tablet TAKE 1 TABLET(2 MG) BY MOUTH DAILY WITH BREAKFAST (Patient not taking: Reported on 2024) 45 tablet 0    hydrocortisone 2.5 % cream Apply 1 application topically once daily. (Patient not taking: Reported on 2024)  0    hydrOXYzine (ATARAX) 50 MG tablet TK 1 T PO Q 6 H PRN (Patient not taking: Reported on 2024)  2    hydrOXYzine pamoate (VISTARIL) 50 MG Cap Take 50 mg by mouth nightly as needed. (Patient not taking: Reported on 2024)  3    ketoconazole (NIZORAL) 2 % cream Apply 1 application topically once daily. (Patient not taking:  Reported on 5/1/2024)  6    ketoconazole (NIZORAL) 2 % shampoo Apply 1 application topically once daily. (Patient not taking: Reported on 5/1/2024)  10    levothyroxine (SYNTHROID) 100 MCG tablet Take 1 tablet (100 mcg total) by mouth before breakfast. 90 tablet 1    meloxicam (MOBIC) 15 MG tablet TAKE 1 TABLET BY MOUTH DAILY AS NEEDED FOR PAIN (Patient not taking: Reported on 3/30/2023) 30 tablet 2    metFORMIN (GLUCOPHAGE) 1000 MG tablet TAKE 1 TABLET(1000 MG) BY MOUTH TWICE DAILY WITH MEALS (Patient not taking: Reported on 5/1/2024) 90 tablet 0    metoprolol succinate (TOPROL-XL) 25 MG 24 hr tablet TAKE 1 TABLET(25 MG) BY MOUTH EVERY MORNING (Patient not taking: Reported on 5/1/2024) 45 tablet 0    mirtazapine (REMERON) 30 MG tablet Take 30 mg by mouth every evening. (Patient not taking: Reported on 5/1/2024)      semaglutide (OZEMPIC) 0.25 mg or 0.5 mg(2 mg/1.5 mL) pen injector Start at 0.25 mg sq weekly for 4 weeks; if tolerated after 4 weeks, can increase to 0.5 mg weekly. (Patient not taking: Reported on 5/1/2024) 3 mL 1    VRAYLAR 3 mg Cap Take 3 mg by mouth. (Patient not taking: Reported on 5/1/2024)       No current facility-administered medications on file prior to visit.       Allergies:  Patient has no known allergies.    Immunizations:  Immunization History   Administered Date(s) Administered    COVID-19, MRNA, LN-S, PF (Pfizer) (Purple Cap) 08/23/2021, 09/15/2021    Influenza 11/15/2019    Influenza - Quadrivalent - PF *Preferred* (6 months and older) 11/02/2018       Review of Systems:  Review of Systems   Respiratory:  Negative for shortness of breath and wheezing.    Cardiovascular:  Negative for chest pain and leg swelling.   Gastrointestinal:  Negative for diarrhea, nausea and vomiting.   Neurological:  Negative for dizziness, syncope and light-headedness.       Objective:    Vitals:  Vitals:    05/01/24 1413   BP: (!) 142/112   Pulse: 80   Resp: 16   Weight: 97.3 kg (214 lb 9.9 oz)   Height: 5'  "11" (1.803 m)       Physical Exam  Vitals reviewed.   Constitutional:       General: He is not in acute distress.     Appearance: He is well-developed.   Eyes:      General: No scleral icterus.  Cardiovascular:      Rate and Rhythm: Normal rate and regular rhythm.      Heart sounds: No murmur heard.  Pulmonary:      Effort: Pulmonary effort is normal. No respiratory distress.      Breath sounds: Normal breath sounds. No wheezing, rhonchi or rales.   Musculoskeletal:      Right lower leg: No edema.      Left lower leg: No edema.   Skin:     General: Skin is warm and dry.   Neurological:      Mental Status: He is alert and oriented to person, place, and time.   Psychiatric:         Behavior: Behavior normal.         Body mass index is 29.93 kg/m².      Data:  Prior A1c 7.3    Stephanie Giron MD  Internal Medicine      "

## 2024-05-02 ENCOUNTER — LAB VISIT (OUTPATIENT)
Dept: LAB | Facility: HOSPITAL | Age: 54
End: 2024-05-02
Attending: INTERNAL MEDICINE
Payer: COMMERCIAL

## 2024-05-02 DIAGNOSIS — E78.2 MIXED HYPERLIPIDEMIA: ICD-10-CM

## 2024-05-02 DIAGNOSIS — E03.9 ACQUIRED HYPOTHYROIDISM: ICD-10-CM

## 2024-05-02 DIAGNOSIS — I10 PRIMARY HYPERTENSION: ICD-10-CM

## 2024-05-02 DIAGNOSIS — E11.9 TYPE 2 DIABETES MELLITUS WITHOUT COMPLICATION, WITHOUT LONG-TERM CURRENT USE OF INSULIN: ICD-10-CM

## 2024-05-02 DIAGNOSIS — Z12.5 SCREENING FOR MALIGNANT NEOPLASM OF PROSTATE: ICD-10-CM

## 2024-05-02 LAB
ALBUMIN SERPL BCP-MCNC: 3.9 G/DL (ref 3.5–5.2)
ALP SERPL-CCNC: 77 U/L (ref 55–135)
ALT SERPL W/O P-5'-P-CCNC: 20 U/L (ref 10–44)
ANION GAP SERPL CALC-SCNC: 8 MMOL/L (ref 8–16)
AST SERPL-CCNC: 15 U/L (ref 10–40)
BILIRUB SERPL-MCNC: 0.3 MG/DL (ref 0.1–1)
BUN SERPL-MCNC: 12 MG/DL (ref 6–20)
CALCIUM SERPL-MCNC: 9.5 MG/DL (ref 8.7–10.5)
CHLORIDE SERPL-SCNC: 101 MMOL/L (ref 95–110)
CHOLEST SERPL-MCNC: 188 MG/DL (ref 120–199)
CHOLEST/HDLC SERPL: 4.5 {RATIO} (ref 2–5)
CO2 SERPL-SCNC: 26 MMOL/L (ref 23–29)
CREAT SERPL-MCNC: 1 MG/DL (ref 0.5–1.4)
EST. GFR  (NO RACE VARIABLE): >60 ML/MIN/1.73 M^2
ESTIMATED AVG GLUCOSE: 186 MG/DL (ref 68–131)
GLUCOSE SERPL-MCNC: 283 MG/DL (ref 70–110)
HBA1C MFR BLD: 8.1 % (ref 4–5.6)
HDLC SERPL-MCNC: 42 MG/DL (ref 40–75)
HDLC SERPL: 22.3 % (ref 20–50)
LDLC SERPL CALC-MCNC: 116.4 MG/DL (ref 63–159)
NONHDLC SERPL-MCNC: 146 MG/DL
POTASSIUM SERPL-SCNC: 5.2 MMOL/L (ref 3.5–5.1)
PROT SERPL-MCNC: 7.2 G/DL (ref 6–8.4)
SODIUM SERPL-SCNC: 135 MMOL/L (ref 136–145)
TRIGL SERPL-MCNC: 148 MG/DL (ref 30–150)

## 2024-05-02 PROCEDURE — 36415 COLL VENOUS BLD VENIPUNCTURE: CPT | Mod: PN | Performed by: INTERNAL MEDICINE

## 2024-05-02 PROCEDURE — 84443 ASSAY THYROID STIM HORMONE: CPT | Performed by: INTERNAL MEDICINE

## 2024-05-02 PROCEDURE — 84153 ASSAY OF PSA TOTAL: CPT | Performed by: INTERNAL MEDICINE

## 2024-05-02 PROCEDURE — 80061 LIPID PANEL: CPT | Performed by: INTERNAL MEDICINE

## 2024-05-02 PROCEDURE — 83036 HEMOGLOBIN GLYCOSYLATED A1C: CPT | Performed by: INTERNAL MEDICINE

## 2024-05-02 PROCEDURE — 80053 COMPREHEN METABOLIC PANEL: CPT | Performed by: INTERNAL MEDICINE

## 2024-05-03 LAB
COMPLEXED PSA SERPL-MCNC: 1.7 NG/ML (ref 0–4)
TSH SERPL DL<=0.005 MIU/L-ACNC: 1.48 UIU/ML (ref 0.4–4)

## 2024-06-05 ENCOUNTER — PATIENT OUTREACH (OUTPATIENT)
Dept: ADMINISTRATIVE | Facility: HOSPITAL | Age: 54
End: 2024-06-05
Payer: COMMERCIAL

## 2024-06-05 NOTE — PROGRESS NOTES
Population Health Chart Review & Patient Outreach Details      Additional [facility] Notes:               Updates Requested / Reviewed:      Updated Care Coordination Note and Immunizations Reconciliation Completed or Queried: [place]         Health Maintenance Topics Overdue:      [facility] Score: 4     Colon Cancer Screening  Eye Exam  Foot Exam  Uncontrolled BP                       Health Maintenance Topic(s) Outreach Outcomes & Actions Taken:    Primary Care Appt - Outreach Outcomes & Actions Taken  : Portal message sent    Medication Adherence / Statins - Outreach Outcomes & Actions Taken  : Portal message sent

## 2024-07-13 ENCOUNTER — TELEPHONE (OUTPATIENT)
Dept: PHARMACY | Facility: CLINIC | Age: 54
End: 2024-07-13
Payer: COMMERCIAL

## 2024-08-15 PROBLEM — R07.89 OTHER CHEST PAIN: Status: RESOLVED | Noted: 2019-08-09 | Resolved: 2024-08-15

## 2024-08-15 PROBLEM — N17.9 AKI (ACUTE KIDNEY INJURY): Status: ACTIVE | Noted: 2024-08-15

## 2024-08-15 PROBLEM — E11.29 TYPE 2 DIABETES MELLITUS WITH MICROALBUMINURIA, WITHOUT LONG-TERM CURRENT USE OF INSULIN: Status: RESOLVED | Noted: 2022-08-18 | Resolved: 2024-08-15

## 2024-08-15 PROBLEM — R80.9 TYPE 2 DIABETES MELLITUS WITH MICROALBUMINURIA, WITHOUT LONG-TERM CURRENT USE OF INSULIN: Status: RESOLVED | Noted: 2022-08-18 | Resolved: 2024-08-15
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
## 2024-08-22 ENCOUNTER — LAB VISIT (OUTPATIENT)
Dept: LAB | Facility: HOSPITAL | Age: 54
End: 2024-08-22
Attending: INTERNAL MEDICINE
Payer: COMMERCIAL

## 2024-08-22 ENCOUNTER — OFFICE VISIT (OUTPATIENT)
Dept: FAMILY MEDICINE | Facility: CLINIC | Age: 54
End: 2024-08-22
Payer: COMMERCIAL

## 2024-08-22 VITALS
DIASTOLIC BLOOD PRESSURE: 104 MMHG | SYSTOLIC BLOOD PRESSURE: 142 MMHG | RESPIRATION RATE: 16 BRPM | WEIGHT: 215.94 LBS | HEART RATE: 68 BPM | HEIGHT: 71 IN | BODY MASS INDEX: 30.23 KG/M2

## 2024-08-22 DIAGNOSIS — N17.9 AKI (ACUTE KIDNEY INJURY): Primary | ICD-10-CM

## 2024-08-22 DIAGNOSIS — I10 PRIMARY HYPERTENSION: ICD-10-CM

## 2024-08-22 DIAGNOSIS — N17.9 AKI (ACUTE KIDNEY INJURY): ICD-10-CM

## 2024-08-22 DIAGNOSIS — E11.9 TYPE 2 DIABETES MELLITUS WITHOUT COMPLICATION, WITHOUT LONG-TERM CURRENT USE OF INSULIN: ICD-10-CM

## 2024-08-22 LAB
ANION GAP SERPL CALC-SCNC: 8 MMOL/L (ref 8–16)
BUN SERPL-MCNC: 13 MG/DL (ref 6–20)
CALCIUM SERPL-MCNC: 9.2 MG/DL (ref 8.7–10.5)
CHLORIDE SERPL-SCNC: 105 MMOL/L (ref 95–110)
CO2 SERPL-SCNC: 26 MMOL/L (ref 23–29)
CREAT SERPL-MCNC: 1 MG/DL (ref 0.5–1.4)
EST. GFR  (NO RACE VARIABLE): >60 ML/MIN/1.73 M^2
GLUCOSE SERPL-MCNC: 211 MG/DL (ref 70–110)
POTASSIUM SERPL-SCNC: 4.5 MMOL/L (ref 3.5–5.1)
SODIUM SERPL-SCNC: 139 MMOL/L (ref 136–145)

## 2024-08-22 PROCEDURE — 99999 PR PBB SHADOW E&M-EST. PATIENT-LVL IV: CPT | Mod: PBBFAC,,, | Performed by: INTERNAL MEDICINE

## 2024-08-22 PROCEDURE — 80048 BASIC METABOLIC PNL TOTAL CA: CPT | Performed by: INTERNAL MEDICINE

## 2024-08-22 PROCEDURE — 36415 COLL VENOUS BLD VENIPUNCTURE: CPT | Mod: PN | Performed by: INTERNAL MEDICINE

## 2024-08-22 RX ORDER — OLMESARTAN MEDOXOMIL AND HYDROCHLOROTHIAZIDE 20/12.5 20; 12.5 MG/1; MG/1
1 TABLET ORAL DAILY
COMMUNITY

## 2024-08-22 RX ORDER — CLONAZEPAM 2 MG/1
2 TABLET ORAL DAILY
COMMUNITY

## 2024-08-22 RX ORDER — PAROXETINE HYDROCHLORIDE 40 MG/1
40 TABLET, FILM COATED ORAL DAILY
COMMUNITY

## 2024-08-22 RX ORDER — DIVALPROEX SODIUM 500 MG/1
500 TABLET, FILM COATED, EXTENDED RELEASE ORAL 2 TIMES DAILY
COMMUNITY

## 2024-08-22 RX ORDER — QUETIAPINE FUMARATE 50 MG/1
50 TABLET, EXTENDED RELEASE ORAL DAILY
COMMUNITY

## 2024-08-22 NOTE — PROGRESS NOTES
"Assessment and Plan:    1. EDMOND (acute kidney injury)  Will recheck BMP to see if this has resolved before restarting other medications. Patient advised that we will call him once labs are back with further instructions as below.   - Basic Metabolic Panel; Future    2. Type 2 diabetes mellitus without complication, without long-term current use of insulin  A1c 8.1. Started on insulin in the hospital with no other treatment. Previously had been on metformin and sulfonylurea, but had not been taking this in more than a year due to just not following up. Does not recall any side effects. Notes that he would eventually be willing to start GLP1 now that he is more comfortable with the concept of injectable medication.   If Cr improved, would plan to start metformin with goal to wean up to 1000 mg BID eventually. Will plan to decrease insulin along with this with the goal of getting off of insulin.    3. Primary hypertension  Not currently taking medication and high today. Will likely need to restart olmesartan/HCTZ, but would need to see BMP first.      ______________________________________________________________________  Subjective:    Chief Complaint:  Hospital follow up    HPI:  Rashid is a 53 y.o. year old male here for hospital follow up.     Recall from our one prior visit in May, patient noted to have uncontrolled HTN and diabetes. Had started BP medication and recommended scheduling labs and follow up to discuss diabetes. He did not show up for this appointment, or the rescheduled appointment.    Hospitalized from 8/15-8/16 for EDMOND 2/2 dehydration (had been working outside all day, stood up and fell down). Cr up to 2.83, down to 1.65 at discharge (was 1.0 at baseline). Treated with IVFs. Started patient on insulin (18 units lantus nightly, also SSI) for an A1c of 8.1, and discontinued all other medications for unclear reasons. Notably, at the time of discharge, patient met with DM education and "it was " "determined that pt was not a good candidate for SSI."    He reports that he has been taking the insulin daily as instructed. States that BG has been in the mid 100s. He does not want to continue taking insulin. He is now open to other medications as we had previously discussed.     He has not been taking the olmesartan/HCTZ since he was in the hospital.     He has been working on hydration.     Medications:  Current Outpatient Medications on File Prior to Visit   Medication Sig Dispense Refill    aspirin 81 MG Chew Take 81 mg by mouth every other day.      clonazePAM (KLONOPIN) 2 MG Tab Take 2 mg by mouth once daily.      divalproex ER (DEPAKOTE ER) 500 MG Tb24 24 hr tablet Take 500 mg by mouth 2 (two) times a day.      insulin aspart U-100 (NOVOLOG U-100 INSULIN ASPART) 100 unit/mL injection Use SSI AC and HS  FSBS 0-150 3 units SQ; 151-200 mg/dl  6 units sq; 201-250 mg/dl 9 units sq; 251-300 mg/dl 12 units sq; 301-350 mg/dl 15 units sq; >351  18 units and call PCP. 4 each 3    insulin glargine U-100, Lantus, (LANTUS SOLOSTAR U-100 INSULIN) 100 unit/mL (3 mL) InPn pen Inject 18 Units into the skin every evening. 4 each 0    olmesartan-hydrochlorothiazide (BENICAR HCT) 20-12.5 mg per tablet Take 1 tablet by mouth once daily.      paroxetine (PAXIL) 40 MG tablet Take 40 mg by mouth once daily.      QUEtiapine (SEROQUEL XR) 50 mg Tb24 Take 50 mg by mouth once daily.      atorvastatin (LIPITOR) 10 MG tablet Take 1 tablet (10 mg total) by mouth once daily. Generic 30 tablet 3    blood sugar diagnostic Strp 1 strip by Misc.(Non-Drug; Combo Route) route 2 (two) times daily. As per his insurance and glucose meter. To test blood sugar levels 100 strip 1    insulin lispro (ADMELOG U-100 INSULIN LISPRO) 100 unit/mL injection Use SSI AC and HS FSBS 0-150 3 units SQ; 151-200 mg/dl 6 units sq; 201-250 mg/dl 9 units sq; 251-300 mg/dl 12 units sq; 301-350 mg/dl 15 units sq; >351 18 units and call PCP. Strength: 100 Units/mL " "Normal, R-12 (Patient not taking: Reported on 8/22/2024) 4 each 1    levothyroxine (SYNTHROID) 100 MCG tablet Take 1 tablet (100 mcg total) by mouth before breakfast. (Patient not taking: Reported on 8/15/2024) 90 tablet 1     No current facility-administered medications on file prior to visit.       Review of Systems:  Review of Systems   Respiratory:  Negative for shortness of breath and wheezing.    Cardiovascular:  Negative for chest pain and leg swelling.   Gastrointestinal:  Negative for diarrhea, nausea and vomiting.   Neurological:  Negative for dizziness, syncope and light-headedness.       Past Medical History:  Past Medical History:   Diagnosis Date    Depression     Diabetes mellitus, type 2     Hyperlipidemia     Hypertension     Obesity (BMI 30.0-34.9)     Thyroid disease        Objective:    Vitals:  Vitals:    08/22/24 1319   BP: (!) 142/104   Pulse: 68   Resp: 16   Weight: 98 kg (215 lb 15.1 oz)   Height: 5' 11" (1.803 m)       Physical Exam  Vitals reviewed.   Constitutional:       General: He is not in acute distress.     Appearance: He is well-developed.   Eyes:      Conjunctiva/sclera: Conjunctivae normal.   Cardiovascular:      Rate and Rhythm: Normal rate and regular rhythm.   Pulmonary:      Effort: Pulmonary effort is normal. No respiratory distress.   Skin:     General: Skin is warm and dry.   Neurological:      Mental Status: He is alert and oriented to person, place, and time.   Psychiatric:         Mood and Affect: Mood normal.         Behavior: Behavior normal.         Data:  Last Cr 1.6    Stephanie Giron MD  Internal Medicine    "

## 2024-09-03 ENCOUNTER — OFFICE VISIT (OUTPATIENT)
Dept: FAMILY MEDICINE | Facility: CLINIC | Age: 54
End: 2024-09-03
Payer: COMMERCIAL

## 2024-09-03 VITALS
HEART RATE: 64 BPM | HEIGHT: 71 IN | WEIGHT: 218.25 LBS | RESPIRATION RATE: 16 BRPM | DIASTOLIC BLOOD PRESSURE: 104 MMHG | BODY MASS INDEX: 30.56 KG/M2 | SYSTOLIC BLOOD PRESSURE: 148 MMHG

## 2024-09-03 DIAGNOSIS — E78.2 MIXED HYPERLIPIDEMIA: ICD-10-CM

## 2024-09-03 DIAGNOSIS — E11.65 TYPE 2 DIABETES MELLITUS WITH HYPERGLYCEMIA, WITH LONG-TERM CURRENT USE OF INSULIN: Primary | ICD-10-CM

## 2024-09-03 DIAGNOSIS — I10 PRIMARY HYPERTENSION: ICD-10-CM

## 2024-09-03 DIAGNOSIS — E03.9 ACQUIRED HYPOTHYROIDISM: ICD-10-CM

## 2024-09-03 DIAGNOSIS — Z79.4 TYPE 2 DIABETES MELLITUS WITH HYPERGLYCEMIA, WITH LONG-TERM CURRENT USE OF INSULIN: Primary | ICD-10-CM

## 2024-09-03 PROCEDURE — G2211 COMPLEX E/M VISIT ADD ON: HCPCS | Mod: S$GLB,,, | Performed by: INTERNAL MEDICINE

## 2024-09-03 PROCEDURE — 3052F HG A1C>EQUAL 8.0%<EQUAL 9.0%: CPT | Mod: CPTII,S$GLB,, | Performed by: INTERNAL MEDICINE

## 2024-09-03 PROCEDURE — 4010F ACE/ARB THERAPY RXD/TAKEN: CPT | Mod: CPTII,S$GLB,, | Performed by: INTERNAL MEDICINE

## 2024-09-03 PROCEDURE — 1160F RVW MEDS BY RX/DR IN RCRD: CPT | Mod: CPTII,S$GLB,, | Performed by: INTERNAL MEDICINE

## 2024-09-03 PROCEDURE — 3008F BODY MASS INDEX DOCD: CPT | Mod: CPTII,S$GLB,, | Performed by: INTERNAL MEDICINE

## 2024-09-03 PROCEDURE — 3066F NEPHROPATHY DOC TX: CPT | Mod: CPTII,S$GLB,, | Performed by: INTERNAL MEDICINE

## 2024-09-03 PROCEDURE — 3077F SYST BP >= 140 MM HG: CPT | Mod: CPTII,S$GLB,, | Performed by: INTERNAL MEDICINE

## 2024-09-03 PROCEDURE — 3061F NEG MICROALBUMINURIA REV: CPT | Mod: CPTII,S$GLB,, | Performed by: INTERNAL MEDICINE

## 2024-09-03 PROCEDURE — 1159F MED LIST DOCD IN RCRD: CPT | Mod: CPTII,S$GLB,, | Performed by: INTERNAL MEDICINE

## 2024-09-03 PROCEDURE — 99214 OFFICE O/P EST MOD 30 MIN: CPT | Mod: S$GLB,,, | Performed by: INTERNAL MEDICINE

## 2024-09-03 PROCEDURE — 3080F DIAST BP >= 90 MM HG: CPT | Mod: CPTII,S$GLB,, | Performed by: INTERNAL MEDICINE

## 2024-09-03 PROCEDURE — 99999 PR PBB SHADOW E&M-EST. PATIENT-LVL III: CPT | Mod: PBBFAC,,, | Performed by: INTERNAL MEDICINE

## 2024-09-03 RX ORDER — ATORVASTATIN CALCIUM 10 MG/1
10 TABLET, FILM COATED ORAL DAILY
Qty: 90 TABLET | Refills: 1 | Status: SHIPPED | OUTPATIENT
Start: 2024-09-03 | End: 2025-09-03

## 2024-09-03 RX ORDER — BLOOD-GLUCOSE SENSOR
1 EACH MISCELLANEOUS
Qty: 2 EACH | Refills: 11 | Status: SHIPPED | OUTPATIENT
Start: 2024-09-03 | End: 2025-09-03

## 2024-09-03 RX ORDER — METFORMIN HYDROCHLORIDE 500 MG/1
1000 TABLET ORAL 2 TIMES DAILY WITH MEALS
Qty: 360 TABLET | Refills: 1 | Status: SHIPPED | OUTPATIENT
Start: 2024-09-03 | End: 2025-09-03

## 2024-09-03 RX ORDER — OLMESARTAN MEDOXOMIL AND HYDROCHLOROTHIAZIDE 20/12.5 20; 12.5 MG/1; MG/1
1 TABLET ORAL DAILY
Qty: 90 TABLET | Refills: 1 | Status: SHIPPED | OUTPATIENT
Start: 2024-09-03

## 2024-09-03 NOTE — PATIENT INSTRUCTIONS
Metformin: Take with food    Week 1: Take 500 mg in the morning  Week 2: Take 500 mg in the morning and 500 mg in the evening  Week 3: Take 1000 mg in the morning and 500 mg in the evening  Week 4: Take 1000 mg in the morning and 1000 mg in the evening

## 2024-09-03 NOTE — PROGRESS NOTES
Assessment and Plan:    1. Type 2 diabetes mellitus with hyperglycemia, with long-term current use of insulin  Not controlled.   Restart metformin today now that Cr back to 1.0, goal to uptitrate to 1000 mg BID by visit in 1 month.   Continue lantus 18 units nightly for now.   Start tracking glucose more intensively with freestyle lor CGM so we can get a better sense of control and prandial excursions.   Next visit, plan to ideally start ozempic and reduce lantus dose as possible based on glucose.   - atorvastatin (LIPITOR) 10 MG tablet; Take 1 tablet (10 mg total) by mouth once daily. Generic  Dispense: 90 tablet; Refill: 1  - metFORMIN (GLUCOPHAGE) 500 MG tablet; Take 2 tablets (1,000 mg total) by mouth 2 (two) times daily with meals.  Dispense: 360 tablet; Refill: 1  - blood-glucose sensor (FREESTYLE LOR 3 SENSOR) Michelle; 1 Device by Misc.(Non-Drug; Combo Route) route every 14 (fourteen) days.  Dispense: 2 each; Refill: 11  - Basic Metabolic Panel; Future  - Hemoglobin A1C; Future    2. Primary hypertension  Restart olmesartan/HCTZ since EDMOND resolved. Will follow up in 1 month.  - olmesartan-hydrochlorothiazide (BENICAR HCT) 20-12.5 mg per tablet; Take 1 tablet by mouth once daily.  Dispense: 90 tablet; Refill: 1  - Basic Metabolic Panel; Future    3. Mixed hyperlipidemia  - atorvastatin (LIPITOR) 10 MG tablet; Take 1 tablet (10 mg total) by mouth once daily. Generic  Dispense: 90 tablet; Refill: 1    4. Acquired hypothyroidism  Last TSH normal without medication. Will recheck, but patient confirmed he has not been taking this.  - TSH; Future      Visit today included increased complexity associated with the care of the episodic problems listed above, including addressing and managing the longitudinal care of the patient due to the serious and/or complex managed problem(s).  ______________________________________________________________________  Subjective:    Chief Complaint:  Follow up chronic medical  conditions.    HPI:  Rashid is a 53 y.o. year old male here to follow up chronic medical conditions.     HTN- olmesartan and HCTZ held during recent hospitalization and not restarted due to EDMOND. BP at home has been high without this.    DM2- A1c 8.1. Started on insulin in the hospital with no other treatment. Currently taking lantus 18 units nightly. Previously had been on metformin and sulfonylurea, but had not been taking this in more than a year due to just not following up. Does not recall any side effects. BG recently has been in the 200s-300s at home.     EDMOND- Cr peaked 2.83 on hospital admission, down to 1.0 on labs since last visit. Reports that he has been doing well with hydration since last visit.     HLD- Had previously been off of atorvastatin, but has been out of this for months due to running out.    Hypothyroidism- Had previously been on levothyroxine, but had been out of this for many months with normal TSH in May 2024, so have not restarted this yet.    Medications:  Current Outpatient Medications on File Prior to Visit   Medication Sig Dispense Refill    aspirin 81 MG Chew Take 81 mg by mouth every other day.      clonazePAM (KLONOPIN) 2 MG Tab Take 2 mg by mouth once daily.      divalproex ER (DEPAKOTE ER) 500 MG Tb24 24 hr tablet Take 500 mg by mouth 2 (two) times a day.      insulin glargine U-100, Lantus, (LANTUS SOLOSTAR U-100 INSULIN) 100 unit/mL (3 mL) InPn pen Inject 18 Units into the skin every evening. 4 each 0    paroxetine (PAXIL) 40 MG tablet Take 40 mg by mouth once daily.      QUEtiapine (SEROQUEL XR) 50 mg Tb24 Take 50 mg by mouth once daily.      [DISCONTINUED] atorvastatin (LIPITOR) 10 MG tablet Take 1 tablet (10 mg total) by mouth once daily. Generic 30 tablet 3    blood sugar diagnostic Strp 1 strip by Misc.(Non-Drug; Combo Route) route 2 (two) times daily. As per his insurance and glucose meter. To test blood sugar levels 100 strip 1    [DISCONTINUED] levothyroxine  "(SYNTHROID) 100 MCG tablet Take 1 tablet (100 mcg total) by mouth before breakfast. (Patient not taking: Reported on 8/15/2024) 90 tablet 1    [DISCONTINUED] olmesartan-hydrochlorothiazide (BENICAR HCT) 20-12.5 mg per tablet Take 1 tablet by mouth once daily. (Patient not taking: Reported on 9/3/2024)       No current facility-administered medications on file prior to visit.       Review of Systems:  Review of Systems   Respiratory:  Negative for shortness of breath and wheezing.    Cardiovascular:  Negative for chest pain and leg swelling.   Gastrointestinal:  Negative for diarrhea, nausea and vomiting.   Neurological:  Negative for dizziness, syncope and light-headedness.       Past Medical History:  Past Medical History:   Diagnosis Date    Depression     Diabetes mellitus, type 2     Hyperlipidemia     Hypertension     Obesity (BMI 30.0-34.9)     Thyroid disease        Objective:    Vitals:  Vitals:    09/03/24 1437 09/03/24 1509   BP: (!) 154/100 (!) 148/104   Pulse: 64    Resp: 16    Weight: 99 kg (218 lb 4.1 oz)    Height: 5' 11" (1.803 m)        Physical Exam  Vitals reviewed.   Constitutional:       General: He is not in acute distress.     Appearance: He is well-developed.   Eyes:      Conjunctiva/sclera: Conjunctivae normal.   Cardiovascular:      Rate and Rhythm: Normal rate and regular rhythm.   Pulmonary:      Effort: Pulmonary effort is normal. No respiratory distress.   Skin:     General: Skin is warm and dry.   Neurological:      Mental Status: He is alert and oriented to person, place, and time.   Psychiatric:         Mood and Affect: Mood normal.         Behavior: Behavior normal.         Data:  Last A1c 8.1  Cr 1.0    Stephanie Giron MD  Internal Medicine    "

## 2024-09-04 RX ORDER — BLOOD-GLUCOSE SENSOR
EACH MISCELLANEOUS
Qty: 2 EACH | Refills: 2 | Status: SHIPPED | OUTPATIENT
Start: 2024-09-04

## 2024-09-04 NOTE — TELEPHONE ENCOUNTER
Care Due:                  Date            Visit Type   Department     Provider  --------------------------------------------------------------------------------                                EP -                              PRIMARY      [facility] FAMILY  Last Visit: 09-      CARE ([facility])   MEDICINE       Stephanie Giron  Next Visit: None Scheduled  None         None Found                                                            Last  Test          Frequency    Reason                     Performed    Due Date  --------------------------------------------------------------------------------    HBA1C.......  6 months...  metFORMIN................  05-   10-    [facility] Embedded Care Due Messages. Reference number: [id number].   9/04/2024 3:05:06 PM CDT

## 2024-09-04 NOTE — TELEPHONE ENCOUNTER
----- Message from Benjy Bass sent at 9/4/2024  2:30 PM CDT -----  Type: Needs Medical Advice  Who Called:  radha  Best Call Back Number:   [id number] - [place] [address] AT [address]  [facility] [address]  Phone: [phone] Fax: [phone]      Additional Information: Radha is calling regarding blood-glucose sensor (FREESTYLE GREGOR 3 SENSOR) Michelle is on back order until october they do have gregor 3 plus they looked it up and is compatible trying to see if that can be used until original sensors are in.Please call back and advis

## 2024-09-04 NOTE — TELEPHONE ENCOUNTER
Spoke w/ Elis. Pt was rx'd FreeStyle brittaney 3 sensors are on back order. She states that the Freestyle Brittaney Plus sensors are compatible and would like to know if we would send an rx for these. She ran it through and confirmed that insurance will cover. Pended. If Ok, please sign.

## 2024-09-10 ENCOUNTER — PATIENT MESSAGE (OUTPATIENT)
Dept: ADMINISTRATIVE | Facility: HOSPITAL | Age: 54
End: 2024-09-10
Payer: COMMERCIAL

## 2024-09-20 ENCOUNTER — TELEPHONE (OUTPATIENT)
Dept: PHARMACY | Facility: CLINIC | Age: 54
End: 2024-09-20
Payer: COMMERCIAL

## 2024-09-21 NOTE — TELEPHONE ENCOUNTER
[facility]/Population Health Chart Review & Patient Outreach Details For Medication Adherence Project    Reason for Outreach Encounter: 3rd Party payor non-compliance report ([employer], [employer], C, etc)  2.  Patient Outreach Method: MyChart message  3.   Medication in question: lisinopril   LAST FILLED: n/a  Hypertension Medications               olmesartan-hydrochlorothiazide (BENICAR HCT) 20-12.5 mg per tablet Take 1 tablet by mouth once daily.              4.  Reviewed and or Updates Made To: Patient Chart  5. Outreach Outcomes and/or actions taken: Medication discontinued    Additional Notes:

## 2024-09-27 NOTE — TELEPHONE ENCOUNTER
----- Message from April Maverick sent at 8/15/2022  2:26 PM CDT -----  Regarding: Blood sugar high  Pt came in to make an with MAURIZIO Mike, but he also wanted to refill his medication since his blood sugar level is at 460. His phone number is [phone]     97

## 2024-10-08 ENCOUNTER — PATIENT OUTREACH (OUTPATIENT)
Dept: ADMINISTRATIVE | Facility: HOSPITAL | Age: 54
End: 2024-10-08
Payer: COMMERCIAL

## 2024-10-31 ENCOUNTER — PATIENT MESSAGE (OUTPATIENT)
Dept: ADMINISTRATIVE | Facility: HOSPITAL | Age: 54
End: 2024-10-31
Payer: COMMERCIAL

## 2024-11-08 ENCOUNTER — PATIENT OUTREACH (OUTPATIENT)
Dept: ADMINISTRATIVE | Facility: HOSPITAL | Age: 54
End: 2024-11-08
Payer: COMMERCIAL

## 2024-11-08 NOTE — LETTER
November 8, 2024    Rashid Del Valle  [address] [place] [address]             [facility]  [address]  [facility] [address]  Phone: [phone] Dear William, [facility] is committed to your overall health and would like to ensure that you are up to date on your recommended test and/or procedures.   Stephanie Giron MD has found that your chart shows you may be due for the following:      Health Maintenance Due   Topic    Eye Exam     Colorectal Cancer Screening     Foot Exam     Hemoglobin A1c      If you have had any of the above done at another facility, please let us know so that we may obtain copies from that facility.  If you have a copy of these records, please provide a copy so that we may update your records.  Also, You are welcome to request that the report be faxed to us at   [phone].    If you have an upcoming scheduled appointment for the above test and/or procedures, please disregard this letter.  Otherwise, please contact us through your [facility] portal or by calling [phone] and we will assist in scheduling these appointments for you.      Thank you for letting us care for you,    Sincerely,    Your [facility]

## 2024-11-22 ENCOUNTER — PATIENT OUTREACH (OUTPATIENT)
Dept: ADMINISTRATIVE | Facility: HOSPITAL | Age: 54
End: 2024-11-22
Payer: COMMERCIAL

## 2024-11-22 NOTE — LETTER
November 22, 2024    Rashid Del Valle  [address] [place] [address]             [facility]  [address]  [facility] [address]  Phone: [phone] Dear Mr. Del Valle,    We are contacting you to follow up on your blood pressure.  Your last reading on file was elevated.  Stephanie Giron MD would like to know if you have been monitoring your blood pressure at home.  You can send any readings that you have through your [facility] Portal or by calling our office.     Please let us know the following:    Recent blood pressure  Heart rate (pulse)  Time blood pressure medication was taken    Please follow the steps below for the most accurate reading:    Empty your bladder before sitting to check your blood pressure  Sit and rest for 5 minutes   You should sit upright with your feet flat on the floor and with your back supported  Put your blood pressure cuff on your bare arm and rest your arm at heart level  Do not hold a conversation or move while taking your blood pressure  If your blood pressure is 140/90 or over, continue to sit and rest quietly for 15 minutes and retake the blood pressure in your other arm, if possible    If you do not have a blood pressure cuff at home we can schedule you to come in for a nurse visit.    Please call if you have any questions.    Sincerely,          Stephanie Giron MD and your [facility]         
     November 22, 2024    Rashid Del Valle  [address] [place] [address]             [facility]  [address]  [facility] [address]  Phone: [phone] Dear William, [facility] is committed to your overall health and would like to ensure that you are up to date on your recommended test and/or procedures.   Stephanie Giron MD has found that your chart shows you may be due for the following:      Health Maintenance Due   Topic    Eye Exam     HIV Screening     Colorectal Cancer Screening     Foot Exam     Hemoglobin A1c     Influenza Vaccine (1)     If you have had any of the above done at another facility, please let us know so that we may obtain copies from that facility.  If you have a copy of these records, please provide a copy so that we may update your records.  Also, You are welcome to request that the report be faxed to us at   [phone].    If you have an upcoming scheduled appointment for the above test and/or procedures, please disregard this letter.  Otherwise, please contact us through your [facility] portal or by calling [phone] and we will assist in scheduling these appointments for you.      Thank you for letting us care for you,    Sincerely,    Your [facility]         
N/A

## 2024-12-18 ENCOUNTER — TELEPHONE (OUTPATIENT)
Dept: PHARMACY | Facility: CLINIC | Age: 54
End: 2024-12-18
Payer: COMMERCIAL

## 2024-12-18 NOTE — TELEPHONE ENCOUNTER
[facility]Population Health Chart Review & Patient Outreach Details For Medication Adherence Project    Reason for Outreach Encounter: 3rd Party payor non-compliance report ([employer], BC[employer], [employer], etc)  2.  Patient Outreach Method: Reviewed patient chart   3.   Medication in question: lisinopril   LAST FILLED: n/a  4.  Reviewed and or Updates Made To: Patient Chart  5. Outreach Outcomes and/or actions taken: Unable to find on med list or in search history

## 2024-12-19 NOTE — PROGRESS NOTES
1) Return in 3 months    Subjective:       Patient ID: Rashid Del Valle is a 47 y.o. male.    Chief Complaint: Abdominal Pain (1-2 weeks, and prior off and on) and Insomnia (due to pain)    HPI  patient presents today to be evaluated for upper abdominal pain.  He states his abdominal pains occur whenever his anxieties increased; he's had these prior in the past.  Presently these been for 1-2 weeks start in the morning, last about 4 hours; returns a few hours later; and is  on and off during the day.  Abdominal pain  he initially states is left upper quadrant; then states across his entire uper abdomen; on a 1-10 a 7-8; waxes and wanes; recently saw his  counselor who recommended he be admitted to the behavioral unit.  He presented to behavioral center across from [place] and after being evaluated was not admitted.  On exam his abdominal pain is actually worse in the epigastric and right upper quadrants compared to left.  He states that the pain is across his entire upper abdomen.  Caffeine intake is one month of coffee per day; 1 soft drink per day; he has no sinus drip; is not taking any NSAID agents or alcohol.  He has no heartburn nor belching, nausea vomiting, or diarrhea, or constipation, no blood in his stools, or black stools. On omeprazole 20 mg a day prn stomach upset. He has no complaints of fever but he has been having intermittent chills at night; he has no prior history of peptic ulcer disease still has his gallbladder.  Dr Rogers, his his psychiatrist  recently recommended he restart phototherapy between 8 and 10, 1 hour would help was his chronic insomnia; he saw him roughly 4-5 days ago and stopped his BuSpar and increased his Klonopin to 1 mg by mouth 3 times a day; patient feels like as if the pains actually increased with his stopping the BuSpar.  He had  Prozac 20 mg daily added to his establish medications which include Remeron 30 mg per night and Seroquel 50 mg per night; counselor recently felt patient needed to  stay with someone for anxiety and depression issues; he denies any suicidal ideation or thoughts of suicide at present but has been hospitalized twice before for psych issues; has included suicide attempt in the past. Case. discussed at length with patient as well as his father who is present at time of evaluation.  Recommended that he go to [facility] ER as soon as possible for further evaluation and treatment for his abdominal pain needing to rule out cholecystitis/gallbladder disease as well as peptic ulcer disease/gastritis.  Further evaluation and treatment needed; initial imaging and blood work on arrival to ER, needing to be obtained in ER.  Can also benefit from psychiatric evaluation with psychiatry consult as well.  Once cleared medically, consideration needs to be given for behavioral unit admission. Total time: 7609-3965; her than 50% of time spent on discussion, counseling, and review.  Past medical history and surgical history were noted as well as social and family history.  Review of systems obtained at length prior to physical exam been performed and discussion of plan of care with the patient and his father at length.     Review of Systems   Constitutional: Positive for appetite change. Negative for unexpected weight change.        Reduced lately for 2 weeks.    HENT: Negative for congestion, postnasal drip, rhinorrhea and sinus pressure.         Denies seasonal allergies, or perennial allergies   Eyes: Negative for discharge and itching.   Respiratory: Negative for cough, chest tightness, shortness of breath and wheezing.    Cardiovascular: Negative for chest pain, palpitations and leg swelling.   Gastrointestinal: Positive for abdominal pain. Negative for blood in stool, constipation, diarrhea, nausea and vomiting.   Endocrine: Negative for polydipsia, polyphagia and polyuria.   Genitourinary: Negative for dysuria and hematuria.   Musculoskeletal: Negative for arthralgias and  "myalgias.   Skin: Negative for rash.   Allergic/Immunologic: Negative for environmental allergies and food allergies.   Neurological: Negative for tremors, seizures and headaches.   Hematological: Negative for adenopathy. Does not bruise/bleed easily.   Psychiatric/Behavioral:        Anxiety w depression; anxiety worsening, mostly over pain, and home. Insomnia. Few nights since good night rest. Problems getting and staying asleep. Taking naps during day. Phototherapy initiated.       Objective:      Vitals:    03/29/18 1156   BP: 120/80   Pulse: 74   Temp: 98.2 °F (36.8 °C)   Weight: 96.4 kg (212 lb 8.4 oz)   Height: 5' 11" (1.803 m)     Body mass index is 29.64 kg/m².    Physical Exam   Constitutional: He is oriented to person, place, and time. He appears well-developed and well-nourished.   HENT:   Head: Normocephalic and atraumatic.   Eyes: EOM are normal.   Neck: Normal range of motion. Neck supple. No thyromegaly present.   Cardiovascular: Normal rate, regular rhythm and normal heart sounds.  Exam reveals no gallop.    No murmur heard.  Pulmonary/Chest: Effort normal and breath sounds normal. No respiratory distress. He has no wheezes. He has no rales.   Abdominal: Soft. Bowel sounds are normal. He exhibits no distension. There is no tenderness. There is no rebound and no guarding.   Moderate epigastric and RUQ tenderness to palp> then LUQ tenderness to palp; no rebound; abd soft. No flank tenderness to palp.   Musculoskeletal: Normal range of motion. He exhibits no edema.   Lymphadenopathy:     He has no cervical adenopathy.   Neurological: He is alert and oriented to person, place, and time.   Moves all 4 extremities fine.   Skin: No rash noted.   Psychiatric: He has a normal mood and affect. His behavior is normal. Thought content normal.   Vitals reviewed.      Assessment:       1. Acute bilateral upper abdominal pain    2. Chills without fever    3. DESI (generalized anxiety disorder)    4. Depression, " unspecified depression type    5. Insomnia, unspecified type        Plan:       Acute bilateral upper abdominal pain; RUQ/epigastric>LUQ abd pain, w intermittent chills at night; needs to go to [facility] for further evaluation asap; need to r/o cholecystitis/GB disease. For further evaluation and treatment of his abd pain including imaging and labs initially. PPI initiation. Long discussion w pt and his Dad. Understand imporatnce of going to ER and not to home.    Chills without fever    DESI (generalized anxiety disorder); meds as per Dr Rogers his psychiatrist. He needs to later call his psychiatrist regarding his meds as well. Also needs to be evaluated from psych standpoint as well; if cleared medically due to severe anxiety w depression, and psych hospitalization in past. Can benefit from psych consult as well if not psych admit when cleared medically.    Depression, unspecified depression type    Insomnia, unspecified type

## 2025-01-30 ENCOUNTER — PATIENT MESSAGE (OUTPATIENT)
Dept: ADMINISTRATIVE | Facility: HOSPITAL | Age: 55
End: 2025-01-30
Payer: COMMERCIAL

## 2025-02-24 ENCOUNTER — PATIENT OUTREACH (OUTPATIENT)
Dept: ADMINISTRATIVE | Facility: HOSPITAL | Age: 55
End: 2025-02-24
Payer: COMMERCIAL

## 2025-02-24 DIAGNOSIS — I10 PRIMARY HYPERTENSION: ICD-10-CM

## 2025-02-24 DIAGNOSIS — Z79.4 TYPE 2 DIABETES MELLITUS WITH HYPERGLYCEMIA, WITH LONG-TERM CURRENT USE OF INSULIN: ICD-10-CM

## 2025-02-24 DIAGNOSIS — E11.65 TYPE 2 DIABETES MELLITUS WITH HYPERGLYCEMIA, WITH LONG-TERM CURRENT USE OF INSULIN: ICD-10-CM

## 2025-02-24 DIAGNOSIS — E78.2 MIXED HYPERLIPIDEMIA: ICD-10-CM

## 2025-02-24 RX ORDER — ATORVASTATIN CALCIUM 10 MG/1
10 TABLET, FILM COATED ORAL
Qty: 90 TABLET | Refills: 0 | Status: SHIPPED | OUTPATIENT
Start: 2025-02-24

## 2025-02-24 NOTE — PROGRESS NOTES
Fu scheduled\  Labs scheduled  Pt sees [facility]-chart to [facility]  Pt has cologuard and will complete  SDOH updated

## 2025-02-24 NOTE — LETTER
AUTHORIZATION FOR RELEASE OF   CONFIDENTIAL INFORMATION        We are seeing Rashid Del Valle, date of birth 1970, in the clinic at [facility]. Stephanie Giron MD is the patient's PCP. Rashid Del Valle has an outstanding lab/procedure at the time we reviewed his chart. In order to help keep his health information updated, he has authorized us to request the following medical record(s):                                              (  x)  EYE EXAM                   Please fax records to [facility], Conlin, Erin M., MD,  at [phone] or email to [email or web address].              Patient Name: Rashid Del Valle  : 1970  Patient Phone #: [phone]

## 2025-02-25 RX ORDER — METFORMIN HYDROCHLORIDE 500 MG/1
1000 TABLET ORAL 2 TIMES DAILY WITH MEALS
Qty: 60 TABLET | Refills: 0 | Status: SHIPPED | OUTPATIENT
Start: 2025-02-25

## 2025-02-25 RX ORDER — INSULIN GLARGINE 100 [IU]/ML
INJECTION, SOLUTION SUBCUTANEOUS
Qty: 3 ML | Refills: 0 | Status: SHIPPED | OUTPATIENT
Start: 2025-02-25

## 2025-02-25 RX ORDER — OLMESARTAN MEDOXOMIL AND HYDROCHLOROTHIAZIDE 20/12.5 20; 12.5 MG/1; MG/1
1 TABLET ORAL
Qty: 30 TABLET | Refills: 0 | Status: SHIPPED | OUTPATIENT
Start: 2025-02-25

## 2025-02-25 NOTE — TELEPHONE ENCOUNTER
Refill Routing Note   Medication(s) are not appropriate for processing by [facility] for the following reason(s):        Required labs outdated  Required vitals abnormal    [facility] action(s):  Approve  Defer     Requires labs : Yes      Medication Therapy Plan: 09/03/24 (!) 148/104      Appointments  past 12m or future 3m with PCP    Date Provider   Last Visit   9/3/2024 Stephanie Giron MD   Next Visit   3/13/2025 Stephanie Giron MD   ED visits in past 90 days: 0        Note composed:10:13 PM 02/24/2025

## 2025-02-25 NOTE — TELEPHONE ENCOUNTER
Care Due:                  Date            Visit Type   Department     Provider  --------------------------------------------------------------------------------                                EP -                              PRIMARY      [facility]  Last Visit: 09-      CARE ([facility])   DELVIN Giron                               -                              [facility]  Next Visit: 03-      [facility] ([facility])   DELVIN Giron                                                            Last  Test          Frequency    Reason                     Performed    Due Date  --------------------------------------------------------------------------------    HBA1C.......  6 months...  metFORMIN................  05-   10-    Lipid Panel.  12 months..  atorvastatin.............  05- 04-    [facility] Embedded Care Due Messages. Reference number: [id number].   2/24/2025 10:13:12 PM CST

## 2025-02-26 ENCOUNTER — TELEPHONE (OUTPATIENT)
Dept: PHARMACY | Facility: CLINIC | Age: 55
End: 2025-02-26
Payer: COMMERCIAL

## 2025-03-19 ENCOUNTER — TELEPHONE (OUTPATIENT)
Dept: PHARMACY | Facility: CLINIC | Age: 55
End: 2025-03-19
Payer: COMMERCIAL

## 2025-03-19 NOTE — TELEPHONE ENCOUNTER
[facility]/Population Health Chart Review & Patient Outreach Details For Medication Adherence Project    Reason for Outreach Encounter: 3rd Party payor non-compliance report ([employer], BCBS, C, etc)  2.  Patient Outreach Method: Qwiqqhart message  3.   Medication in question: benicar hct    LAST FILLED: 2/25/25 for 30 day supply  Hypertension Medications              olmesartan-hydrochlorothiazide (BENICAR HCT) 20-12.5 mg per tablet TAKE 1 TABLET BY MOUTH DAILY              4.  Reviewed and or Updates Made To: Patient Chart  5. Outreach Outcomes and/or actions taken: Sent inquiry to patient: Waiting for response.

## 2025-04-03 ENCOUNTER — TELEPHONE (OUTPATIENT)
Dept: FAMILY MEDICINE | Facility: CLINIC | Age: 55
End: 2025-04-03
Payer: COMMERCIAL

## 2025-04-03 NOTE — TELEPHONE ENCOUNTER
No care due was identified.  [facility] Embedded Care Due Messages. Reference number: [id number].   4/03/2025 5:48:22 PM CDT

## 2025-04-03 NOTE — TELEPHONE ENCOUNTER
Refill Routing Note   Medication(s) are not appropriate for processing by [facility] for the following reason(s):        Required labs outdated    [facility] action(s):  Defer               Appointments  past 12m or future 3m with PCP    Date Provider   Last Visit   9/3/2024 Stephanie Giron MD   Next Visit   Visit date not found Stephanie Giron MD   ED visits in past 90 days: 0        Note composed:6:10 PM 04/03/2025

## 2025-04-06 RX ORDER — INSULIN GLARGINE 100 [IU]/ML
INJECTION, SOLUTION SUBCUTANEOUS
Qty: 15 ML | Refills: 0 | OUTPATIENT
Start: 2025-04-06

## 2025-04-07 NOTE — TELEPHONE ENCOUNTER
He was supposed to follow up in 1 month for multiple uncontrolled medical problems when I saw him in September. He has no-showed for 2 appointments since then after meds were sent as a courtesy. If he schedules again, I can send enough medications to last until that appointment. If he does not come again, no additional medications will be sent.

## 2025-04-22 ENCOUNTER — TELEPHONE (OUTPATIENT)
Dept: PHARMACY | Facility: CLINIC | Age: 55
End: 2025-04-22
Payer: COMMERCIAL

## 2025-04-23 NOTE — TELEPHONE ENCOUNTER
[facility] Chart Review & Patient Outreach Details For Medication Adherence Project    Reason for Outreach Encounter: 3rd Party payor non-compliance report ([employer], BCBS, C, etc)  2.  Patient Outreach Method: T[place] message  3.   Medication in question: benicar hct   LAST FILLED: 2/25/25 for 30 day supply  Hypertension Medications              olmesartan-hydrochlorothiazide (BENICAR HCT) 20-12.5 mg per tablet TAKE 1 TABLET BY MOUTH DAILY              4.  Reviewed and or Updates Made To: Patient Chart  5. Outreach Outcomes and/or actions taken: Sent inquiry to patient: Waiting for response.

## 2025-05-01 ENCOUNTER — PATIENT MESSAGE (OUTPATIENT)
Dept: ADMINISTRATIVE | Facility: HOSPITAL | Age: 55
End: 2025-05-01
Payer: COMMERCIAL

## 2025-06-16 ENCOUNTER — TELEPHONE (OUTPATIENT)
Dept: PHARMACY | Facility: CLINIC | Age: 55
End: 2025-06-16
Payer: COMMERCIAL

## 2025-06-17 NOTE — TELEPHONE ENCOUNTER
[facility] Chart Review & Patient Outreach Details For Medication Adherence Project    Reason for Outreach Encounter: 3rd Party payor non-compliance report (Hum[employer], BCBS, C, etc)  2.  Patient Outreach Method: CoContesthart message  3.   Medication in question: benicar hct    LAST FILLED: 2/25/25 for 30 day supply  Hypertension Medications              olmesartan-hydrochlorothiazide (BENICAR HCT) 20-12.5 mg per tablet TAKE 1 TABLET BY MOUTH DAILY              4.  Reviewed and or Updates Made To: Patient Chart  5. Outreach Outcomes and/or actions taken: Sent inquiry to patient: Waiting for response.

## 2025-06-25 ENCOUNTER — PATIENT MESSAGE (OUTPATIENT)
Dept: ADMINISTRATIVE | Facility: HOSPITAL | Age: 55
End: 2025-06-25
Payer: COMMERCIAL

## 2025-07-28 ENCOUNTER — TELEPHONE (OUTPATIENT)
Dept: PHARMACY | Facility: CLINIC | Age: 55
End: 2025-07-28
Payer: COMMERCIAL

## 2025-07-29 NOTE — TELEPHONE ENCOUNTER
[facility] Chart Review & Patient Outreach Details For Medication Adherence Project    Reason for Outreach Encounter: 3rd Party payor non-compliance report (Hum[employer], BCBS, C, etc)  2.  Patient Outreach Method: Glaxstarhart message  3.   Medication in question: benicar hct   LAST FILLED: 2/25/25 for 30 day supply  Hypertension Medications              olmesartan-hydrochlorothiazide (BENICAR HCT) 20-12.5 mg per tablet TAKE 1 TABLET BY MOUTH DAILY              4.  Reviewed and or Updates Made To: Patient Chart  5. Outreach Outcomes and/or actions taken: Sent inquiry to patient: Waiting for response.

## 2025-07-31 ENCOUNTER — PATIENT MESSAGE (OUTPATIENT)
Dept: ADMINISTRATIVE | Facility: HOSPITAL | Age: 55
End: 2025-07-31
Payer: COMMERCIAL

## 2025-08-04 ENCOUNTER — PATIENT MESSAGE (OUTPATIENT)
Dept: ADMINISTRATIVE | Facility: HOSPITAL | Age: 55
End: 2025-08-04
Payer: COMMERCIAL

## 2025-08-18 ENCOUNTER — TELEPHONE (OUTPATIENT)
Dept: PHARMACY | Facility: CLINIC | Age: 55
End: 2025-08-18
Payer: COMMERCIAL

## 2025-08-19 ENCOUNTER — TELEPHONE (OUTPATIENT)
Dept: FAMILY MEDICINE | Facility: CLINIC | Age: 55
End: 2025-08-19
Payer: COMMERCIAL

## 2025-08-21 ENCOUNTER — OFFICE VISIT (OUTPATIENT)
Dept: FAMILY MEDICINE | Facility: CLINIC | Age: 55
End: 2025-08-21
Payer: COMMERCIAL

## 2025-08-21 VITALS
SYSTOLIC BLOOD PRESSURE: 128 MMHG | HEART RATE: 77 BPM | BODY MASS INDEX: 28.86 KG/M2 | DIASTOLIC BLOOD PRESSURE: 84 MMHG | OXYGEN SATURATION: 98 % | TEMPERATURE: 98 F | RESPIRATION RATE: 16 BRPM | WEIGHT: 206.13 LBS | HEIGHT: 71 IN

## 2025-08-21 DIAGNOSIS — E03.9 ACQUIRED HYPOTHYROIDISM: ICD-10-CM

## 2025-08-21 DIAGNOSIS — I10 PRIMARY HYPERTENSION: ICD-10-CM

## 2025-08-21 DIAGNOSIS — E78.2 MIXED HYPERLIPIDEMIA: ICD-10-CM

## 2025-08-21 DIAGNOSIS — E11.65 TYPE 2 DIABETES MELLITUS WITH HYPERGLYCEMIA, WITHOUT LONG-TERM CURRENT USE OF INSULIN: Primary | ICD-10-CM

## 2025-08-21 DIAGNOSIS — F31.70 BIPOLAR DISORDER IN PARTIAL REMISSION, MOST RECENT EPISODE UNSPECIFIED TYPE: ICD-10-CM

## 2025-08-21 PROCEDURE — 99999 PR PBB SHADOW E&M-EST. PATIENT-LVL III: CPT | Mod: PBBFAC,,, | Performed by: INTERNAL MEDICINE

## 2025-08-22 ENCOUNTER — LAB VISIT (OUTPATIENT)
Dept: LAB | Facility: HOSPITAL | Age: 55
End: 2025-08-22
Payer: COMMERCIAL

## 2025-08-22 DIAGNOSIS — E78.2 MIXED HYPERLIPIDEMIA: ICD-10-CM

## 2025-08-22 DIAGNOSIS — E03.9 ACQUIRED HYPOTHYROIDISM: ICD-10-CM

## 2025-08-22 DIAGNOSIS — E11.65 TYPE 2 DIABETES MELLITUS WITH HYPERGLYCEMIA, WITHOUT LONG-TERM CURRENT USE OF INSULIN: ICD-10-CM

## 2025-08-22 DIAGNOSIS — I10 PRIMARY HYPERTENSION: ICD-10-CM

## 2025-08-22 LAB
ALBUMIN SERPL BCP-MCNC: 4.1 G/DL (ref 3.5–5.2)
ALBUMIN/CREAT UR: 6.2 UG/MG
ALP SERPL-CCNC: 52 UNIT/L (ref 40–150)
ALT SERPL W/O P-5'-P-CCNC: 12 UNIT/L (ref 0–55)
ANION GAP (OHS): 9 MMOL/L (ref 8–16)
AST SERPL-CCNC: 17 UNIT/L (ref 0–50)
BILIRUB SERPL-MCNC: 0.7 MG/DL (ref 0.1–1)
BUN SERPL-MCNC: 14 MG/DL (ref 6–20)
CALCIUM SERPL-MCNC: 9.4 MG/DL (ref 8.7–10.5)
CHLORIDE SERPL-SCNC: 104 MMOL/L (ref 95–110)
CHOLEST SERPL-MCNC: 174 MG/DL (ref 120–199)
CHOLEST/HDLC SERPL: 3.8 {RATIO} (ref 2–5)
CO2 SERPL-SCNC: 27 MMOL/L (ref 23–29)
CREAT SERPL-MCNC: 1 MG/DL (ref 0.5–1.4)
CREAT UR-MCNC: 146 MG/DL (ref 23–375)
EAG (OHS): 137 MG/DL (ref 68–131)
ERYTHROCYTE [DISTWIDTH] IN BLOOD BY AUTOMATED COUNT: 12.7 % (ref 11.5–14.5)
GFR SERPLBLD CREATININE-BSD FMLA CKD-EPI: >60 ML/MIN/1.73/M2
GLUCOSE SERPL-MCNC: 102 MG/DL (ref 70–110)
HBA1C MFR BLD: 6.4 % (ref 4–5.6)
HCT VFR BLD AUTO: 41.7 % (ref 40–54)
HDLC SERPL-MCNC: 46 MG/DL (ref 40–75)
HDLC SERPL: 26.4 % (ref 20–50)
HGB BLD-MCNC: 14 GM/DL (ref 14–18)
LDLC SERPL CALC-MCNC: 101.8 MG/DL (ref 63–159)
MCH RBC QN AUTO: 30.5 PG (ref 27–31)
MCHC RBC AUTO-ENTMCNC: 33.6 G/DL (ref 32–36)
MCV RBC AUTO: 91 FL (ref 82–98)
MICROALBUMIN UR-MCNC: 9 UG/ML
NONHDLC SERPL-MCNC: 128 MG/DL
PLATELET # BLD AUTO: 281 K/UL (ref 150–450)
PMV BLD AUTO: 10.1 FL (ref 9.2–12.9)
POTASSIUM SERPL-SCNC: 3.8 MMOL/L (ref 3.5–5.1)
PROT SERPL-MCNC: 7.3 GM/DL (ref 6–8.4)
RBC # BLD AUTO: 4.59 M/UL (ref 4.6–6.2)
SODIUM SERPL-SCNC: 140 MMOL/L (ref 136–145)
T4 FREE SERPL-MCNC: 1.19 NG/DL (ref 0.71–1.51)
TRIGL SERPL-MCNC: 131 MG/DL (ref 30–150)
TSH SERPL-ACNC: 5.47 UIU/ML (ref 0.4–4)
WBC # BLD AUTO: 9.42 K/UL (ref 3.9–12.7)

## 2025-08-22 PROCEDURE — 85027 COMPLETE CBC AUTOMATED: CPT

## 2025-08-22 PROCEDURE — 83036 HEMOGLOBIN GLYCOSYLATED A1C: CPT

## 2025-08-22 PROCEDURE — 80061 LIPID PANEL: CPT

## 2025-08-22 PROCEDURE — 82247 BILIRUBIN TOTAL: CPT

## 2025-08-22 PROCEDURE — 84443 ASSAY THYROID STIM HORMONE: CPT

## 2025-08-22 PROCEDURE — 84439 ASSAY OF FREE THYROXINE: CPT

## 2025-08-22 PROCEDURE — 82043 UR ALBUMIN QUANTITATIVE: CPT

## 2025-08-22 PROCEDURE — 36415 COLL VENOUS BLD VENIPUNCTURE: CPT | Mod: PN

## 2025-08-25 ENCOUNTER — PATIENT OUTREACH (OUTPATIENT)
Dept: ADMINISTRATIVE | Facility: HOSPITAL | Age: 55
End: 2025-08-25
Payer: COMMERCIAL

## 2025-08-28 ENCOUNTER — OFFICE VISIT (OUTPATIENT)
Dept: FAMILY MEDICINE | Facility: CLINIC | Age: 55
End: 2025-08-28
Payer: COMMERCIAL

## 2025-08-28 VITALS
BODY MASS INDEX: 28.33 KG/M2 | HEART RATE: 66 BPM | OXYGEN SATURATION: 98 % | WEIGHT: 202.38 LBS | DIASTOLIC BLOOD PRESSURE: 90 MMHG | SYSTOLIC BLOOD PRESSURE: 140 MMHG | RESPIRATION RATE: 16 BRPM | HEIGHT: 71 IN | TEMPERATURE: 98 F

## 2025-08-28 DIAGNOSIS — E11.65 TYPE 2 DIABETES MELLITUS WITH HYPERGLYCEMIA, WITH LONG-TERM CURRENT USE OF INSULIN: Primary | ICD-10-CM

## 2025-08-28 DIAGNOSIS — Z79.4 TYPE 2 DIABETES MELLITUS WITH HYPERGLYCEMIA, WITH LONG-TERM CURRENT USE OF INSULIN: Primary | ICD-10-CM

## 2025-08-28 DIAGNOSIS — I10 PRIMARY HYPERTENSION: ICD-10-CM

## 2025-08-28 DIAGNOSIS — E78.2 MIXED HYPERLIPIDEMIA: ICD-10-CM

## 2025-08-28 PROCEDURE — 99999 PR PBB SHADOW E&M-EST. PATIENT-LVL IV: CPT | Mod: PBBFAC,,, | Performed by: INTERNAL MEDICINE

## 2025-08-28 RX ORDER — OLMESARTAN MEDOXOMIL AND HYDROCHLOROTHIAZIDE 20/12.5 20; 12.5 MG/1; MG/1
1 TABLET ORAL DAILY
Qty: 30 TABLET | Refills: 0 | Status: SHIPPED | OUTPATIENT
Start: 2025-08-28

## 2025-08-28 RX ORDER — ATORVASTATIN CALCIUM 10 MG/1
10 TABLET, FILM COATED ORAL DAILY
Qty: 90 TABLET | Refills: 0 | Status: SHIPPED | OUTPATIENT
Start: 2025-08-28

## 2025-08-28 RX ORDER — METFORMIN HYDROCHLORIDE 500 MG/1
1000 TABLET ORAL 2 TIMES DAILY WITH MEALS
Qty: 180 TABLET | Refills: 0 | Status: SHIPPED | OUTPATIENT
Start: 2025-08-28